# Patient Record
Sex: FEMALE | Race: WHITE | Employment: OTHER | URBAN - NONMETROPOLITAN AREA
[De-identification: names, ages, dates, MRNs, and addresses within clinical notes are randomized per-mention and may not be internally consistent; named-entity substitution may affect disease eponyms.]

---

## 2022-04-17 ENCOUNTER — APPOINTMENT (OUTPATIENT)
Dept: GENERAL RADIOLOGY | Age: 87
DRG: 502 | End: 2022-04-17
Attending: HOSPITALIST
Payer: MEDICARE

## 2022-04-17 ENCOUNTER — HOSPITAL ENCOUNTER (INPATIENT)
Age: 87
LOS: 5 days | Discharge: HOME HEALTH | DRG: 502 | End: 2022-04-22
Attending: HOSPITALIST | Admitting: FAMILY MEDICINE
Payer: MEDICARE

## 2022-04-17 PROBLEM — M65.9 TENOSYNOVITIS: Status: ACTIVE | Noted: 2022-04-17

## 2022-04-17 LAB
ALBUMIN SERPL-MCNC: 2.2 G/DL (ref 3.5–5.1)
ALP BLD-CCNC: 112 U/L (ref 38–126)
ALT SERPL-CCNC: 9 U/L (ref 11–66)
ANION GAP SERPL CALCULATED.3IONS-SCNC: 12 MEQ/L (ref 8–16)
AST SERPL-CCNC: 17 U/L (ref 5–40)
BILIRUB SERPL-MCNC: 0.3 MG/DL (ref 0.3–1.2)
BUN BLDV-MCNC: 9 MG/DL (ref 7–22)
C-REACTIVE PROTEIN: 11.25 MG/DL (ref 0–1)
CALCIUM SERPL-MCNC: 8.3 MG/DL (ref 8.5–10.5)
CHLORIDE BLD-SCNC: 103 MEQ/L (ref 98–111)
CO2: 21 MEQ/L (ref 23–33)
CREAT SERPL-MCNC: 0.6 MG/DL (ref 0.4–1.2)
GFR SERPL CREATININE-BSD FRML MDRD: > 90 ML/MIN/1.73M2
GLUCOSE BLD-MCNC: 152 MG/DL (ref 70–108)
GLUCOSE BLD-MCNC: 182 MG/DL (ref 70–108)
GLUCOSE BLD-MCNC: 286 MG/DL (ref 70–108)
POTASSIUM SERPL-SCNC: 3.3 MEQ/L (ref 3.5–5.2)
PROCALCITONIN: 0.21 NG/ML (ref 0.01–0.09)
SCAN OF BLOOD SMEAR: NORMAL
SEDIMENTATION RATE, ERYTHROCYTE: 135 MM/HR (ref 0–20)
SODIUM BLD-SCNC: 136 MEQ/L (ref 135–145)
TOTAL PROTEIN: 6.5 G/DL (ref 6.1–8)
VANCOMYCIN RANDOM: < 4 UG/ML (ref 0.1–39.9)

## 2022-04-17 PROCEDURE — 6370000000 HC RX 637 (ALT 250 FOR IP): Performed by: NURSE PRACTITIONER

## 2022-04-17 PROCEDURE — 85025 COMPLETE CBC W/AUTO DIFF WBC: CPT

## 2022-04-17 PROCEDURE — 85651 RBC SED RATE NONAUTOMATED: CPT

## 2022-04-17 PROCEDURE — 6360000002 HC RX W HCPCS: Performed by: NURSE PRACTITIONER

## 2022-04-17 PROCEDURE — 93005 ELECTROCARDIOGRAM TRACING: CPT | Performed by: NURSE PRACTITIONER

## 2022-04-17 PROCEDURE — 86140 C-REACTIVE PROTEIN: CPT

## 2022-04-17 PROCEDURE — 87801 DETECT AGNT MULT DNA AMPLI: CPT

## 2022-04-17 PROCEDURE — 80202 ASSAY OF VANCOMYCIN: CPT

## 2022-04-17 PROCEDURE — 84145 PROCALCITONIN (PCT): CPT

## 2022-04-17 PROCEDURE — 87147 CULTURE TYPE IMMUNOLOGIC: CPT

## 2022-04-17 PROCEDURE — 80053 COMPREHEN METABOLIC PANEL: CPT

## 2022-04-17 PROCEDURE — 2580000003 HC RX 258: Performed by: NURSE PRACTITIONER

## 2022-04-17 PROCEDURE — 36415 COLL VENOUS BLD VENIPUNCTURE: CPT

## 2022-04-17 PROCEDURE — 82948 REAGENT STRIP/BLOOD GLUCOSE: CPT

## 2022-04-17 PROCEDURE — 99223 1ST HOSP IP/OBS HIGH 75: CPT | Performed by: NURSE PRACTITIONER

## 2022-04-17 PROCEDURE — 71046 X-RAY EXAM CHEST 2 VIEWS: CPT

## 2022-04-17 PROCEDURE — 1200000003 HC TELEMETRY R&B

## 2022-04-17 PROCEDURE — 87040 BLOOD CULTURE FOR BACTERIA: CPT

## 2022-04-17 RX ORDER — ONDANSETRON 4 MG/1
4 TABLET, ORALLY DISINTEGRATING ORAL EVERY 8 HOURS PRN
Status: DISCONTINUED | OUTPATIENT
Start: 2022-04-17 | End: 2022-04-20 | Stop reason: SDUPTHER

## 2022-04-17 RX ORDER — LISINOPRIL 20 MG/1
20 TABLET ORAL DAILY
Status: DISCONTINUED | OUTPATIENT
Start: 2022-04-18 | End: 2022-04-22 | Stop reason: HOSPADM

## 2022-04-17 RX ORDER — COVID-19 ANTIGEN TEST
2 KIT MISCELLANEOUS 2 TIMES DAILY PRN
COMMUNITY

## 2022-04-17 RX ORDER — ACETAMINOPHEN 325 MG/1
650 TABLET ORAL EVERY 6 HOURS PRN
COMMUNITY

## 2022-04-17 RX ORDER — ATENOLOL 50 MG/1
50 TABLET ORAL DAILY
Status: DISCONTINUED | OUTPATIENT
Start: 2022-04-18 | End: 2022-04-22 | Stop reason: HOSPADM

## 2022-04-17 RX ORDER — SODIUM CHLORIDE 0.9 % (FLUSH) 0.9 %
5-40 SYRINGE (ML) INJECTION EVERY 12 HOURS SCHEDULED
Status: DISCONTINUED | OUTPATIENT
Start: 2022-04-17 | End: 2022-04-22 | Stop reason: HOSPADM

## 2022-04-17 RX ORDER — POLYETHYLENE GLYCOL 3350 17 G/17G
17 POWDER, FOR SOLUTION ORAL DAILY PRN
Status: DISCONTINUED | OUTPATIENT
Start: 2022-04-17 | End: 2022-04-22 | Stop reason: HOSPADM

## 2022-04-17 RX ORDER — NICOTINE POLACRILEX 4 MG
15 LOZENGE BUCCAL PRN
Status: DISCONTINUED | OUTPATIENT
Start: 2022-04-17 | End: 2022-04-17 | Stop reason: SDUPTHER

## 2022-04-17 RX ORDER — ATENOLOL 50 MG/1
50 TABLET ORAL DAILY
COMMUNITY

## 2022-04-17 RX ORDER — HYDROCODONE BITARTRATE AND ACETAMINOPHEN 5; 325 MG/1; MG/1
2 TABLET ORAL EVERY 4 HOURS PRN
Status: DISCONTINUED | OUTPATIENT
Start: 2022-04-17 | End: 2022-04-22 | Stop reason: HOSPADM

## 2022-04-17 RX ORDER — LOPERAMIDE HYDROCHLORIDE 2 MG/1
2 CAPSULE ORAL 4 TIMES DAILY PRN
COMMUNITY

## 2022-04-17 RX ORDER — SODIUM CHLORIDE 9 MG/ML
INJECTION, SOLUTION INTRAVENOUS PRN
Status: DISCONTINUED | OUTPATIENT
Start: 2022-04-17 | End: 2022-04-22 | Stop reason: HOSPADM

## 2022-04-17 RX ORDER — AMLODIPINE BESYLATE 5 MG/1
5 TABLET ORAL DAILY
COMMUNITY

## 2022-04-17 RX ORDER — DEXTROSE MONOHYDRATE 25 G/50ML
12.5 INJECTION, SOLUTION INTRAVENOUS PRN
Status: DISCONTINUED | OUTPATIENT
Start: 2022-04-17 | End: 2022-04-17 | Stop reason: SDUPTHER

## 2022-04-17 RX ORDER — ACETAMINOPHEN 325 MG/1
650 TABLET ORAL EVERY 6 HOURS PRN
Status: DISCONTINUED | OUTPATIENT
Start: 2022-04-17 | End: 2022-04-22 | Stop reason: HOSPADM

## 2022-04-17 RX ORDER — HYDROCHLOROTHIAZIDE 25 MG/1
12.5 TABLET ORAL DAILY
COMMUNITY

## 2022-04-17 RX ORDER — ACETAMINOPHEN 650 MG/1
650 SUPPOSITORY RECTAL EVERY 6 HOURS PRN
Status: DISCONTINUED | OUTPATIENT
Start: 2022-04-17 | End: 2022-04-22 | Stop reason: HOSPADM

## 2022-04-17 RX ORDER — SODIUM CHLORIDE 0.9 % (FLUSH) 0.9 %
5-40 SYRINGE (ML) INJECTION PRN
Status: DISCONTINUED | OUTPATIENT
Start: 2022-04-17 | End: 2022-04-22 | Stop reason: HOSPADM

## 2022-04-17 RX ORDER — LEVOTHYROXINE SODIUM 88 UG/1
88 TABLET ORAL DAILY
Status: DISCONTINUED | OUTPATIENT
Start: 2022-04-18 | End: 2022-04-22 | Stop reason: HOSPADM

## 2022-04-17 RX ORDER — LISINOPRIL 20 MG/1
20 TABLET ORAL DAILY
COMMUNITY

## 2022-04-17 RX ORDER — AMLODIPINE BESYLATE 5 MG/1
5 TABLET ORAL DAILY
Status: DISCONTINUED | OUTPATIENT
Start: 2022-04-18 | End: 2022-04-22 | Stop reason: HOSPADM

## 2022-04-17 RX ORDER — LACTOBACILLUS RHAMNOSUS GG 10B CELL
2 CAPSULE ORAL
Status: DISCONTINUED | OUTPATIENT
Start: 2022-04-17 | End: 2022-04-22 | Stop reason: HOSPADM

## 2022-04-17 RX ORDER — HYDROCODONE BITARTRATE AND ACETAMINOPHEN 5; 325 MG/1; MG/1
1 TABLET ORAL EVERY 4 HOURS PRN
Status: DISCONTINUED | OUTPATIENT
Start: 2022-04-17 | End: 2022-04-22 | Stop reason: HOSPADM

## 2022-04-17 RX ORDER — LEVOTHYROXINE SODIUM 88 UG/1
88 TABLET ORAL DAILY
COMMUNITY

## 2022-04-17 RX ORDER — DEXTROSE MONOHYDRATE 50 MG/ML
100 INJECTION, SOLUTION INTRAVENOUS PRN
Status: DISCONTINUED | OUTPATIENT
Start: 2022-04-17 | End: 2022-04-22 | Stop reason: HOSPADM

## 2022-04-17 RX ORDER — ONDANSETRON 2 MG/ML
4 INJECTION INTRAMUSCULAR; INTRAVENOUS EVERY 6 HOURS PRN
Status: DISCONTINUED | OUTPATIENT
Start: 2022-04-17 | End: 2022-04-20 | Stop reason: SDUPTHER

## 2022-04-17 RX ADMIN — VANCOMYCIN HYDROCHLORIDE 1500 MG: 5 INJECTION, POWDER, LYOPHILIZED, FOR SOLUTION INTRAVENOUS at 18:52

## 2022-04-17 RX ADMIN — INSULIN LISPRO 3 UNITS: 100 INJECTION, SOLUTION INTRAVENOUS; SUBCUTANEOUS at 17:25

## 2022-04-17 RX ADMIN — Medication 2 CAPSULE: at 17:25

## 2022-04-17 RX ADMIN — ACETAMINOPHEN 650 MG: 325 TABLET ORAL at 18:05

## 2022-04-17 RX ADMIN — PIPERACILLIN AND TAZOBACTAM 3375 MG: 3; .375 INJECTION, POWDER, LYOPHILIZED, FOR SOLUTION INTRAVENOUS at 23:08

## 2022-04-17 RX ADMIN — INSULIN LISPRO 1 UNITS: 100 INJECTION, SOLUTION INTRAVENOUS; SUBCUTANEOUS at 21:08

## 2022-04-17 ASSESSMENT — PAIN SCALES - GENERAL
PAINLEVEL_OUTOF10: 0
PAINLEVEL_OUTOF10: 3

## 2022-04-17 NOTE — PROGRESS NOTES
Transfer from [facility] 11/16/29 left hand tenosynovitis-flexor nd dorsal tendons infected, she has been there for 10 days and was being treated for cellulitis - Vanc and Zosyn, swelling and redness cant make a fist WBC 16.65 151/66 71 12 98.1 96% mild cog imp but AOx3, ortho consulted, Hx HTN DM hypothyroid Dr Fatoumata Hernandez adm

## 2022-04-17 NOTE — PROGRESS NOTES
[address] [facility] Pharmacokinetic Monitoring Service - Vancomycin     Taina Rollins is a 80 y.o. female starting on vancomycin therapy for SSTI. Pharmacy consulted by Romulo Villaseñor CNP for monitoring and adjustment. Target Concentration: Goal trough of 10-15 mg/L and AUC/DALILA <500 mg*hr/L    Additional Antimicrobials: Zosyn    Pertinent Laboratory Values: Wt Readings from Last 1 Encounters:   04/17/22 180 lb (81.6 kg)     Temp Readings from Last 1 Encounters:   04/17/22 98.2 °F (36.8 °C) (Oral)     Estimated Creatinine Clearance: 64 mL/min (based on SCr of 0.6 mg/dL). Recent Labs     04/17/22  1640   CREATININE 0.6   WBC 15.2*     Procalcitonin: 0.21 (04/17/22)    Pertinent Cultures:  Culture Date Source Results   04/17/22 BC x 2 sent   MRSA Nasal Swab: N/A. Non-respiratory infection. Plan:  Dosing recommendations based on Bayesian software  Start vancomycin 1500 mg IV q24h  Anticipated AUC of 446 and trough concentration of 12.4 at steady state  Vanc, rdm today (04/17/22 @1640 was <4.0); spoke with [facility] - last dose of vancomycin was on 04/13/22 and maintenance dose there was 1500 mg IV q24h.    Renal labs as indicated   Vancomycin concentration ordered for 04/18/22 @ 0600    Pharmacy will continue to monitor patient and adjust therapy as indicated    Thank you for the consult,  Tamie Hidalgo, [facility]  4/17/2022 5:47 PM

## 2022-04-17 NOTE — H&P
History & Physical    Patient:  Tom Garcia  YOB: 1929  Date of Service: 4/17/2022  MRN: [id number]   Acct:  [de-identified]   Primary Care Physician: No primary care provider on file. Chief Complaint:Transfer to left had tenosynovitis. Assessment / Plan:    1. Left hand tenosynovitis. Resume Zosyn and Vancomycin started at [facility] facility. Will request pharmacy to dose Vanc. Pain control, norco pain panel PRN. Elevate extremity. Obtain inflammatory markers, PCT, CBC and 2 sets of blood cultures. Consult to orthopedics. Consider ID consult. 2. Preoperative clearance. RCRI class 1, 3.9% risk of cardiac event including death in the next 30 days. METs <4. Moderate surgical risk with age and preoperative functional status. Obtain CMP, CBC, EKG and CXR to complete preoperative clearance. 3. Ess HTN. Resume home ACE/BB and Norvasc with holding parameters. 4. T2DM. Holding Metformin. Start low dose SSI. Accu checks ac/hs. Hypoglycemic protocol. Carb control meals. 5. Hypothyroidism. Synthroid resumed. 6. Advanced age. PT/OT. History of Present Illness:   History obtained from chart review, the patient and granddaughter. The patient is a 80 y.o. female who presents with left hand tenosynovitis. Please note no records have been sent with this patient and they are not available for review in [facility]. Patient reports approximately 2 weeks ago starting with left arm erythema that extended significantly up her arm. She denied fevers. Went to [address] [place] [address] and was transferred to [facility]. She was there for 10 days. She was treated with IV Vanc and Zosyn. Arm had improved. She was changed to an oral ATB for several days with worsening. She was restarted on Zosyn and Vanc. Despite IV ATBs her hand remained quite edematous, erythremic and she lost mobility of her fingers. She has good sensation. General surgery was consulted at [facility] facility.  CT of the head noted tendon involvement. Transfer was deemed appropriate to [facility], family requested this facility. Images were reviewed by our hand team, who accepted the transfer with request for hospitalist to admit. Patient does live home alone. She cares for herself with the assistance from [place] [facility] and her family. She uses a walker. Granddaughter reports some falls at home, but nothing recent. Patient is able to perform her own ADLs. She is not able to do deep cleaning, unable to walk up a flight of stair or any significant discharge. She does not exercise. She denies any dizziness, chest pain, SOB/CHARLES or syncope. No history of CAD, CHF, CVA or CKD. She does have a history of diabetes, this is controlled with Metformin alone. Past Medical History:        Diagnosis Date    Diabetes mellitus ([address]     Hypertension     Kidney stones     Thyroid disease        Past Surgical History:        Procedure Laterality Date    APPENDECTOMY      CATARACT REMOVAL Bilateral     CHOLECYSTECTOMY      HYSTERECTOMY         Home Medications:   No current facility-administered medications on file prior to encounter.      Current Outpatient Medications on File Prior to Encounter   Medication Sig Dispense Refill    amLODIPine (NORVASC) 5 MG tablet Take 5 mg by mouth daily      atenolol (TENORMIN) 50 MG tablet Take 50 mg by mouth daily      Glycerin-Hypromellose- (DRY EYE RELIEF DROPS OP) Apply 1 drop to eye as needed (both eyes)      loperamide (IMODIUM) 2 MG capsule Take 2 mg by mouth 4 times daily as needed for Diarrhea      levothyroxine (SYNTHROID) 88 MCG tablet Take 88 mcg by mouth Daily      lisinopril (PRINIVIL;ZESTRIL) 20 MG tablet Take 20 mg by mouth daily      metFORMIN (GLUCOPHAGE) 500 MG tablet Take 500 mg by mouth 2 times daily (with meals)      hydroCHLOROthiazide (HYDRODIURIL) 25 MG tablet Take 12.5 mg by mouth daily      Multiple Vitamins-Minerals (MULTIVITAMIN GUMMIES ADULT PO) Take 1 each by mouth daily      acetaminophen (TYLENOL) 325 MG tablet Take 650 mg by mouth every 6 hours as needed for Pain      Naproxen Sodium (ALEVE) 220 MG CAPS Take 2 capsules by mouth 2 times daily as needed for Pain         Allergies:  Sulfa antibiotics    Social History:    reports that she has never smoked. She has never used smokeless tobacco. She reports that she does not drink alcohol and does not use drugs. Family History:   History reviewed. No pertinent family history. Review of systems:  Constitutional: no fever, no night sweats, + fatigue  Head: no headache, no head injury, no migranes. Eye: no blurring of vision, no double vision. Ears: no hearing difficulty, no tinnitus  Mouth/throat: no ulceration, dental caries, dysphagia  Lungs: no cough, no shortness of breath, no wheeze  CVS: no palpitation, no chest pain, no shortness of breath  GI: no abdominal pain, no nausea , no vomiting, no constipation + diarrhea  RICKI: no dysuria, frequency and urgency, no hematuria, no kidney stones  Musculoskeletal: + edema left hand  Endocrine: no polyuria, polydypsia, no cold or heat intolerence  Hematology: no anemia, no easy brusing or bleeding, no hx of clotting disorder  Dermatology: no skin rash, no eczema, no prurities,  Psychiatry: no depression, no anxiety,no panic attacks, no suicide ideation  Neurology: no syncope, no seizures, no numbness or tingling of hands, no numbness or tingling of feet, no paresis    10 point review of systems completed, all other than noted above are negative. Vitals:   Vitals:    04/17/22 1445   BP: 137/61   Pulse: 63   Resp: 18   Temp: 98.2 °F (36.8 °C)   SpO2: 95%      BMI: There is no height or weight on file to calculate BMI.                 Exam:  Physical Examination: General appearance - alert, well appearing, and in no distress  Mental status - alert, oriented to person, place, and time  Neck - supple, no significant adenopathy, no JVD, or carotid bruits  Chest - clear to auscultation, no wheezes, rales or rhonchi, symmetric air entry  Heart - normal rate, regular rhythm, normal S1, S2, no murmurs, rubs, clicks or gallops  Abdomen - soft, nontender, nondistended, no masses or organomegaly  Neurological - alert, oriented, normal speech, no focal findings or movement disorder noted  Musculoskeletal - Left hand edema, erythremia, decreased ROM. Good neurovascular check to hand. Extremities - peripheral pulses normal, no pedal edema, no clubbing or cyanosis  Skin - normal coloration and turgor, no rashes, no suspicious skin lesions noted      Review of Labs and Diagnostic Testing:    No results found for this or any previous visit (from the past 24 hour(s)). Radiology:     No results found.       EKG: Pending      DVT prophylaxis: [x] Lovenox                                 [] SCDs                                 [] SQ Heparin                                 [] Encourage ambulation, low risk for DVT, no chemical or mechanical prophylaxis necessary              [] Already on Anticoagulation                Anticipated Disposition upon discharge: [x] Home                                                                         [] Home with Home Health                                                                         [] Boni Rodríguez                                                                         [] [address] [place] [address],[address]          Electronically signed by [facility] ANTHONY AGUILAR CNP on 4/17/2022 at 3:48 PM

## 2022-04-17 NOTE — CONSULTS
Orthopedic Consult    Requesting Physician: Yumi Perry, *    CHIEF COMPLAINT:  Left hand tenosynovitis    HISTORY OF PRESENT ILLNESS:      The patient is a 80 y.o., right hand dominant female  with PMH of type 2 DM controlled with metformin, HTN, and hypothyroidism who was transferred from [facility] today for left hand tenosynovitis. Patient's granddaughter is at bedside and assisting with answering questions. Redness and swelling of left hand started 4/7/22 with no known injury or cause. Presented to [facility] [place] [facility] 4/8/2022 for increased pain, redness, and swelling to left hand/forearm and was transferred to [facility] for admission for cellulitis. Erythema and swelling extended from dorsum of hand to elbow. Was started of Zosyn and Vancomycin with symptom improvement. Was transitioned to oral Keflex once symptoms began improving. Noted marked worsening of symptoms around hand and wrist at that time and was transitioned back to IV antibiotics. CT of left hand showed tendon involvement per attending physician at [facility]. Transfer was requested at that time. Patient reports increased pain and erythema to dorsum of hand and wrist. Increased swelling and associated decreased ROM to left wrist and all digits of left hand. Uses walker at all time for ambulation. Lives at home alone. Granddaughter and [place] [facility] come regularly to assist patient. Patient's granddaughter states patient will be staying with her at discharge. Patient denies chest pain, shortness of breath, lightheadedness, fever, chills, and numbness/tingling in fingers.     Past Medical History:    Past Medical History:   Diagnosis Date    Diabetes mellitus ([address]     Hypertension     Kidney stones     Thyroid disease        Past Surgical History:    Past Surgical History:   Procedure Laterality Date    APPENDECTOMY      CATARACT REMOVAL Bilateral     CHOLECYSTECTOMY      HYSTERECTOMY         Medications Prior to Admission:   Current Facility-Administered Medications   Medication Dose Route Frequency Provider Last Rate Last Admin    [START ON 4/18/2022] amLODIPine (NORVASC) tablet 5 mg  5 mg Oral Daily ANTHONY Hurd CNP        [START ON 4/18/2022] atenolol (TENORMIN) tablet 50 mg  50 mg Oral Daily ANTHONY Hurd CNP        [START ON 4/18/2022] levothyroxine (SYNTHROID) tablet 88 mcg  88 mcg Oral Daily ANTHONY Hurd CNP        [START ON 4/18/2022] lisinopril (PRINIVIL;ZESTRIL) tablet 20 mg  20 mg Oral Daily ANTHONY Mcghee CNP        insulin lispro (HUMALOG) injection vial 0-6 Units  0-6 Units SubCUTAneous TID WC ANTHONY Mcghee CNP   3 Units at 04/17/22 1725    insulin lispro (HUMALOG) injection vial 0-3 Units  0-3 Units SubCUTAneous Nightly ANTHONY Mcghee CNP        sodium chloride flush 0.9 % injection 5-40 mL  5-40 mL IntraVENous 2 times per day ANTHONY Mcghee - CNP        sodium chloride flush 0.9 % injection 5-40 mL  5-40 mL IntraVENous PRN ANTHONY Mcghee - CNP        0.9 % sodium chloride infusion   IntraVENous PRN ANTHONY Mcghee CNP        enoxaparin (LOVENOX) injection 40 mg  40 mg SubCUTAneous Q24H ANTHONY Mcghee CNP        ondansetron (ZOFRAN-ODT) disintegrating tablet 4 mg  4 mg Oral Q8H PRN ANTHONY Hurd CNP        Or    ondansetron (ZOFRAN) injection 4 mg  4 mg IntraVENous Q6H PRN ANTHONY Mcghee CNP        polyethylene glycol (GLYCOLAX) packet 17 g  17 g Oral Daily PRN ANTHONY Hurd CNP        acetaminophen (TYLENOL) tablet 650 mg  650 mg Oral Q6H PRN ANTHONY Hurd CNP   650 mg at 04/17/22 1805    Or    acetaminophen (TYLENOL) suppository 650 mg  650 mg Rectal Q6H PRN ANTHONY Mcghee CNP        piperacillin-tazobactam (ZOSYN) 3,375 mg in dextrose 5 % 50 mL IVPB extended infusion (mini-bag)  3,375 mg IntraVENous Q8H Yeyoe ECTOR Curry, APRN - CNP        glucagon (rDNA) injection 1 mg  1 mg IntraMUSCular PRN Rosa Curry, ANTHONY - CNP        dextrose 5 % solution  100 mL/hr IntraVENous PRN Rosa uCrry, APRN - CNP        lactobacillus (CULTURELLE) capsule 2 capsule  2 capsule Oral TID WC Yeyoe ECTOR Curry, APRN - CNP   2 capsule at 04/17/22 1725    dextrose bolus (hypoglycemia) 10% 125 mL  125 mL IntraVENous PRN Yeyoe N Andreschith, APRN - CNP        Or    dextrose bolus (hypoglycemia) 10% 250 mL  250 mL IntraVENous PRN Yeyoe N Andreschith, APRN - CNP        glucose chewable tablet 4 each  4 tablet Oral PRN Yeyoe N Andreschith, APRN - CNP        HYDROcodone-acetaminophen (NORCO) 5-325 MG per tablet 1 tablet  1 tablet Oral Q4H PRN Yeyoe N Patricio, APRN - CNP        Or    HYDROcodone-acetaminophen (NORCO) 5-325 MG per tablet 2 tablet  2 tablet Oral Q4H PRN Yeyoe ECTOR Camposchith, APRN - CNP        vancomycin (VANCOCIN) intermittent dosing (placeholder)   Other RX Placeholder Yeyoe ECTOR Camposchivero, APRN - CNP        vancomycin (VANCOCIN) 1,500 mg in dextrose 5 % 500 mL IVPB  1,500 mg IntraVENous Q24H Yeyoe ECTOR Curry, APRN - CNP             Allergies:  Sulfa antibiotics    Social History:   Negative for tobacco use, alcohol abuse and illicit drug abuse    Family History:  History reviewed. No pertinent family history. REVIEW OF SYSTEMS:  Negative other than noted in HPI    PHYSICAL EXAM:  Vitals:    04/17/22 1445   BP: 137/61   Pulse: 63   Resp: 18   Temp: 98.2 °F (36.8 °C)   SpO2: 95%     General examination: Patient is a well appearing 81yo female resting comfortably in bed. Awake, alert and oriented x3. No acute distress. Following commands and answering questions appropriately     Cardiovascular: Regular rate and rhythm.     Pulmonary: Regular rate and respiratory effort.  No audible wheezing or use of accessory muscles:     Right upper extremity: Skin intact no erythema, ecchymosis, or edema. No tenderness to palpation of clavicle, periscapular, AC joint, shoulder, proximal humerus, elbow, forearm, wrist, distal radius/ulna, and hand. Motor intact AIN, PIN, radial, median, ulna nerve distribution. Sensation intact light touch axillary, AIN, PIN, radial, median, ulna nerve distribution. Fingers warm and well perfused.  2+ radial pulse.     Left upper extremity: Moderate edema and erythema to dorsal and palmar aspects of hand and wrist. Moderate edema to digits 1-5. No tenderness to palpation of clavicle, periscapular, AC joint, shoulder, proximal humerus, elbow, and proximal forearm. Moderate tenderness to distal forearm, wrist, and hand. Minimal tenderness to palpation of fingers distal to MCP joints. Able to minimally flex and extend fingers. Decreased ROM limited to due swelling and pain. Pain with passive extension of fingers. Minimal pain with passive flexion of fingers. Sensation intact light touch axillary, AIN, PIN, radial, median, ulna nerve distribution. Fingers warm and well perfused.  2+ radial pulse. DATA:  CBC:   Lab Results   Component Value Date    WBC 15.2 04/17/2022    RBC 3.06 04/17/2022    HGB 9.4 04/17/2022    HCT 29.7 04/17/2022     04/17/2022     BMP:   Lab Results   Component Value Date     04/17/2022    K 3.3 04/17/2022     04/17/2022    CO2 21 04/17/2022    BUN 9 04/17/2022    CREATININE 0.6 04/17/2022    CALCIUM 8.3 04/17/2022    GLUCOSE 152 04/17/2022     PT/INR: No results found for: PROTIME, INR      Radiology: See electronic record to view reports. Reports reviewed. ASSESSMENT:Principal Problem:    Tenosynovitis  Resolved Problems:    * No resolved hospital problems. *       PLAN:  1)  Discussed patient with Dr. Yuriy Irvin.  Plan to re-evaluate in the AM. If erythema, swelling, and pain is same or worsening will plan for I&D of left hand tomorrow at 2pm. Erythematic border outlined.   2)  NPO at midnight  3) Continue IV antibiotics per hospitalist  4) Continue current medical management   5) PO pain meds as needed       Electronically signed by MABEL Callaway on 4/17/2022 at 6:07 PM

## 2022-04-17 NOTE — PROGRESS NOTES
Pt admitted to  7K15 via cart/stretcher. Complaints: left hand edema/cellulitis. IV IV push only, no IV fluids. IV site free of s/s of infection or infiltration. Vital signs obtained. Assessment and data collection initiated. Two nurse skin assessment performed by Vianca Reed and Laurel Bah. Oriented to room. Explained patients right to have family, representative or physician notified of their admission. Patient has Declined for physician to be notified. Patient has Declined for family/representative to be notified. The patient is interested in [facility]. [facility]s meds to beds program?:  No    Policies and procedures for 7 explained. All questions answered with no further questions at this time. Fall prevention and safety brochure discussed with patient. Bed alarm on. Call light in reach.

## 2022-04-18 ENCOUNTER — ANESTHESIA EVENT (OUTPATIENT)
Dept: OPERATING ROOM | Age: 87
DRG: 502 | End: 2022-04-18
Payer: MEDICARE

## 2022-04-18 ENCOUNTER — ANESTHESIA (OUTPATIENT)
Dept: OPERATING ROOM | Age: 87
DRG: 502 | End: 2022-04-18
Payer: MEDICARE

## 2022-04-18 VITALS
OXYGEN SATURATION: 95 % | RESPIRATION RATE: 2 BRPM | SYSTOLIC BLOOD PRESSURE: 116 MMHG | DIASTOLIC BLOOD PRESSURE: 56 MMHG

## 2022-04-18 LAB
ACINETOBACTER BAUMANNII FILM ARRAY: NOT DETECTED
ANION GAP SERPL CALCULATED.3IONS-SCNC: 14 MEQ/L (ref 8–16)
BASOPHILS # BLD: 0.5 %
BASOPHILS # BLD: 0.6 %
BASOPHILS ABSOLUTE: 0.1 THOU/MM3 (ref 0–0.1)
BASOPHILS ABSOLUTE: 0.1 THOU/MM3 (ref 0–0.1)
BOTTLE TYPE: ABNORMAL
BUN BLDV-MCNC: 8 MG/DL (ref 7–22)
CALCIUM SERPL-MCNC: 7.9 MG/DL (ref 8.5–10.5)
CANDIDA ALBICANS FILM ARRAY: NOT DETECTED
CANDIDA GLABRATA FILM ARRAY: NOT DETECTED
CANDIDA KRUSEI FILM ARRAY: NOT DETECTED
CANDIDA PARAPSILOSIS FILM ARRAY: NOT DETECTED
CANDIDA TROPICALIS FILM ARRAY: NOT DETECTED
CARBAPENEM RESITANT FILM ARRAY: ABNORMAL
CHLORIDE BLD-SCNC: 104 MEQ/L (ref 98–111)
CO2: 20 MEQ/L (ref 23–33)
CREAT SERPL-MCNC: 0.6 MG/DL (ref 0.4–1.2)
DIFFERENTIAL TYPE: ABNORMAL
EKG ATRIAL RATE: 62 BPM
EKG P AXIS: 59 DEGREES
EKG P-R INTERVAL: 202 MS
EKG Q-T INTERVAL: 482 MS
EKG QRS DURATION: 76 MS
EKG QTC CALCULATION (BAZETT): 489 MS
EKG R AXIS: 50 DEGREES
EKG T AXIS: 11 DEGREES
EKG VENTRICULAR RATE: 62 BPM
ENTERBACTER CLOACAE FILM ARRAY: NOT DETECTED
ENTERBACTERIACEAE FILM ARRAY: NOT DETECTED
ENTEROCOCCUS FILM ARRAY: NOT DETECTED
EOSINOPHIL # BLD: 2.1 %
EOSINOPHIL # BLD: 2.5 %
EOSINOPHILS ABSOLUTE: 0.3 THOU/MM3 (ref 0–0.4)
EOSINOPHILS ABSOLUTE: 0.3 THOU/MM3 (ref 0–0.4)
ERYTHROCYTE [DISTWIDTH] IN BLOOD BY AUTOMATED COUNT: 14.6 % (ref 11.5–14.5)
ERYTHROCYTE [DISTWIDTH] IN BLOOD BY AUTOMATED COUNT: 14.6 % (ref 11.5–14.5)
ERYTHROCYTE [DISTWIDTH] IN BLOOD BY AUTOMATED COUNT: 51.6 FL (ref 35–45)
ERYTHROCYTE [DISTWIDTH] IN BLOOD BY AUTOMATED COUNT: 53.7 FL (ref 35–45)
ESCHERICHIA COLI FILM ARRAY: NOT DETECTED
GFR SERPL CREATININE-BSD FRML MDRD: > 90 ML/MIN/1.73M2
GLUCOSE BLD-MCNC: 142 MG/DL (ref 70–108)
GLUCOSE BLD-MCNC: 148 MG/DL (ref 70–108)
GLUCOSE BLD-MCNC: 199 MG/DL (ref 70–108)
GLUCOSE BLD-MCNC: 220 MG/DL (ref 70–108)
HAEMOPHILUS INFLUENZA FILM ARRAY: NOT DETECTED
HCT VFR BLD CALC: 29.7 % (ref 37–47)
HCT VFR BLD CALC: 30.1 % (ref 37–47)
HEMOGLOBIN: 9.2 GM/DL (ref 12–16)
HEMOGLOBIN: 9.4 GM/DL (ref 12–16)
IMMATURE GRANS (ABS): 0.4 THOU/MM3 (ref 0–0.07)
IMMATURE GRANS (ABS): 0.42 THOU/MM3 (ref 0–0.07)
IMMATURE GRANULOCYTES: 2.8 %
IMMATURE GRANULOCYTES: 2.9 %
KLEBSIELLA OXYTOCA FILM ARRAY: NOT DETECTED
KLEBSIELLA PNEUMONIAE FILM ARRAY: NOT DETECTED
LISTERIA MONOCYTOGENES FILM ARRAY: NOT DETECTED
LYMPHOCYTES # BLD: 12.5 %
LYMPHOCYTES # BLD: 12.7 %
LYMPHOCYTES ABSOLUTE: 1.7 THOU/MM3 (ref 1–4.8)
LYMPHOCYTES ABSOLUTE: 1.9 THOU/MM3 (ref 1–4.8)
MAGNESIUM: 1.9 MG/DL (ref 1.6–2.4)
MCH RBC QN AUTO: 30.7 PG (ref 26–33)
MCH RBC QN AUTO: 30.8 PG (ref 26–33)
MCHC RBC AUTO-ENTMCNC: 30.6 GM/DL (ref 32.2–35.5)
MCHC RBC AUTO-ENTMCNC: 31.6 GM/DL (ref 32.2–35.5)
MCV RBC AUTO: 100.7 FL (ref 81–99)
MCV RBC AUTO: 97.1 FL (ref 81–99)
METHICILLIN RESISTANT FILM ARRAY: DETECTED
MONOCYTES # BLD: 13.9 %
MONOCYTES # BLD: 14.7 %
MONOCYTES ABSOLUTE: 2 THOU/MM3 (ref 0.4–1.3)
MONOCYTES ABSOLUTE: 2.1 THOU/MM3 (ref 0.4–1.3)
NEISSERIA MENIGITIDIS FILM ARRAY: NOT DETECTED
NUCLEATED RED BLOOD CELLS: 0 /100 WBC
NUCLEATED RED BLOOD CELLS: 0 /100 WBC
PATHOLOGIST REVIEW: ABNORMAL
PLATELET # BLD: 399 THOU/MM3 (ref 130–400)
PLATELET # BLD: 428 THOU/MM3 (ref 130–400)
PLATELET ESTIMATE: ABNORMAL
PMV BLD AUTO: 11.2 FL (ref 9.4–12.4)
PMV BLD AUTO: 11.5 FL (ref 9.4–12.4)
POTASSIUM REFLEX MAGNESIUM: 3.5 MEQ/L (ref 3.5–5.2)
PROTEUS FILM ARRAY: NOT DETECTED
PSEUDOMONAS AERUGINOSA FILM ARRAY: NOT DETECTED
RBC # BLD: 2.99 MILL/MM3 (ref 4.2–5.4)
RBC # BLD: 3.06 MILL/MM3 (ref 4.2–5.4)
SCAN OF BLOOD SMEAR: NORMAL
SEG NEUTROPHILS: 66.8 %
SEG NEUTROPHILS: 68 %
SEGMENTED NEUTROPHILS ABSOLUTE COUNT: 10.3 THOU/MM3 (ref 1.8–7.7)
SEGMENTED NEUTROPHILS ABSOLUTE COUNT: 9.3 THOU/MM3 (ref 1.8–7.7)
SERRATIA MARCESCENS FILM ARRAY: NOT DETECTED
SODIUM BLD-SCNC: 138 MEQ/L (ref 135–145)
SOURCE OF BLOOD CULTURE: ABNORMAL
STAPH AUREUS FILM ARRAY: NOT DETECTED
STAPHYLOCOCCUS FILM ARRAY: DETECTED
STREP AGALACTIAE FILM ARRAY: NOT DETECTED
STREP PNEUMONIAE FILM ARRAY: NOT DETECTED
STREP PYOCGENES FILM ARRAY: NOT DETECTED
STREPTOCOCCUS FILM ARRAY: NOT DETECTED
VANCOMYCIN RANDOM: 11 UG/ML (ref 0.1–39.9)
VANCOMYCIN RESISTANT FILM ARRAY: ABNORMAL
WBC # BLD: 13.9 THOU/MM3 (ref 4.8–10.8)
WBC # BLD: 15.2 THOU/MM3 (ref 4.8–10.8)

## 2022-04-18 PROCEDURE — 80202 ASSAY OF VANCOMYCIN: CPT

## 2022-04-18 PROCEDURE — 6360000002 HC RX W HCPCS: Performed by: NURSE PRACTITIONER

## 2022-04-18 PROCEDURE — 80048 BASIC METABOLIC PNL TOTAL CA: CPT

## 2022-04-18 PROCEDURE — 3600000002 HC SURGERY LEVEL 2 BASE: Performed by: ORTHOPAEDIC SURGERY

## 2022-04-18 PROCEDURE — 7100000000 HC PACU RECOVERY - FIRST 15 MIN: Performed by: ORTHOPAEDIC SURGERY

## 2022-04-18 PROCEDURE — 97116 GAIT TRAINING THERAPY: CPT

## 2022-04-18 PROCEDURE — 6360000002 HC RX W HCPCS: Performed by: ANESTHESIOLOGY

## 2022-04-18 PROCEDURE — 97535 SELF CARE MNGMENT TRAINING: CPT

## 2022-04-18 PROCEDURE — 83735 ASSAY OF MAGNESIUM: CPT

## 2022-04-18 PROCEDURE — 87070 CULTURE OTHR SPECIMN AEROBIC: CPT

## 2022-04-18 PROCEDURE — 7100000001 HC PACU RECOVERY - ADDTL 15 MIN: Performed by: ORTHOPAEDIC SURGERY

## 2022-04-18 PROCEDURE — 87075 CULTR BACTERIA EXCEPT BLOOD: CPT

## 2022-04-18 PROCEDURE — 2580000003 HC RX 258: Performed by: ANESTHESIOLOGY

## 2022-04-18 PROCEDURE — 1200000003 HC TELEMETRY R&B

## 2022-04-18 PROCEDURE — 85025 COMPLETE CBC W/AUTO DIFF WBC: CPT

## 2022-04-18 PROCEDURE — 6370000000 HC RX 637 (ALT 250 FOR IP): Performed by: NURSE PRACTITIONER

## 2022-04-18 PROCEDURE — 82948 REAGENT STRIP/BLOOD GLUCOSE: CPT

## 2022-04-18 PROCEDURE — 99232 SBSQ HOSP IP/OBS MODERATE 35: CPT | Performed by: NURSE PRACTITIONER

## 2022-04-18 PROCEDURE — 2580000003 HC RX 258: Performed by: NURSE PRACTITIONER

## 2022-04-18 PROCEDURE — 87205 SMEAR GRAM STAIN: CPT

## 2022-04-18 PROCEDURE — 0L960ZZ DRAINAGE OF LEFT LOWER ARM AND WRIST TENDON, OPEN APPROACH: ICD-10-PCS | Performed by: ORTHOPAEDIC SURGERY

## 2022-04-18 PROCEDURE — 97530 THERAPEUTIC ACTIVITIES: CPT

## 2022-04-18 PROCEDURE — 93010 ELECTROCARDIOGRAM REPORT: CPT | Performed by: INTERNAL MEDICINE

## 2022-04-18 PROCEDURE — 3700000001 HC ADD 15 MINUTES (ANESTHESIA): Performed by: ORTHOPAEDIC SURGERY

## 2022-04-18 PROCEDURE — 97166 OT EVAL MOD COMPLEX 45 MIN: CPT

## 2022-04-18 PROCEDURE — 3700000000 HC ANESTHESIA ATTENDED CARE: Performed by: ORTHOPAEDIC SURGERY

## 2022-04-18 PROCEDURE — 1200000000 HC SEMI PRIVATE

## 2022-04-18 PROCEDURE — 2709999900 HC NON-CHARGEABLE SUPPLY: Performed by: ORTHOPAEDIC SURGERY

## 2022-04-18 PROCEDURE — 3600000012 HC SURGERY LEVEL 2 ADDTL 15MIN: Performed by: ORTHOPAEDIC SURGERY

## 2022-04-18 PROCEDURE — 36415 COLL VENOUS BLD VENIPUNCTURE: CPT

## 2022-04-18 PROCEDURE — 97162 PT EVAL MOD COMPLEX 30 MIN: CPT

## 2022-04-18 RX ORDER — DIPHENHYDRAMINE HYDROCHLORIDE 50 MG/ML
12.5 INJECTION INTRAMUSCULAR; INTRAVENOUS
Status: DISCONTINUED | OUTPATIENT
Start: 2022-04-18 | End: 2022-04-18 | Stop reason: HOSPADM

## 2022-04-18 RX ORDER — PROPOFOL 10 MG/ML
INJECTION, EMULSION INTRAVENOUS PRN
Status: DISCONTINUED | OUTPATIENT
Start: 2022-04-18 | End: 2022-04-18 | Stop reason: SDUPTHER

## 2022-04-18 RX ORDER — IPRATROPIUM BROMIDE AND ALBUTEROL SULFATE 2.5; .5 MG/3ML; MG/3ML
1 SOLUTION RESPIRATORY (INHALATION)
Status: DISCONTINUED | OUTPATIENT
Start: 2022-04-18 | End: 2022-04-18 | Stop reason: HOSPADM

## 2022-04-18 RX ORDER — FENTANYL CITRATE 50 UG/ML
INJECTION, SOLUTION INTRAMUSCULAR; INTRAVENOUS PRN
Status: DISCONTINUED | OUTPATIENT
Start: 2022-04-18 | End: 2022-04-18 | Stop reason: SDUPTHER

## 2022-04-18 RX ORDER — SODIUM CHLORIDE 0.9 % (FLUSH) 0.9 %
5-40 SYRINGE (ML) INJECTION PRN
Status: DISCONTINUED | OUTPATIENT
Start: 2022-04-18 | End: 2022-04-18 | Stop reason: HOSPADM

## 2022-04-18 RX ORDER — MEPERIDINE HYDROCHLORIDE 25 MG/ML
12.5 INJECTION INTRAMUSCULAR; INTRAVENOUS; SUBCUTANEOUS EVERY 5 MIN PRN
Status: DISCONTINUED | OUTPATIENT
Start: 2022-04-18 | End: 2022-04-18 | Stop reason: HOSPADM

## 2022-04-18 RX ORDER — SODIUM CHLORIDE 9 MG/ML
25 INJECTION, SOLUTION INTRAVENOUS PRN
Status: DISCONTINUED | OUTPATIENT
Start: 2022-04-18 | End: 2022-04-18 | Stop reason: HOSPADM

## 2022-04-18 RX ORDER — LORAZEPAM 2 MG/ML
0.5 INJECTION INTRAMUSCULAR
Status: DISCONTINUED | OUTPATIENT
Start: 2022-04-18 | End: 2022-04-18 | Stop reason: HOSPADM

## 2022-04-18 RX ORDER — HYDRALAZINE HYDROCHLORIDE 20 MG/ML
10 INJECTION INTRAMUSCULAR; INTRAVENOUS
Status: DISCONTINUED | OUTPATIENT
Start: 2022-04-18 | End: 2022-04-18 | Stop reason: HOSPADM

## 2022-04-18 RX ORDER — SODIUM CHLORIDE 0.9 % (FLUSH) 0.9 %
5-40 SYRINGE (ML) INJECTION EVERY 12 HOURS SCHEDULED
Status: DISCONTINUED | OUTPATIENT
Start: 2022-04-18 | End: 2022-04-18 | Stop reason: HOSPADM

## 2022-04-18 RX ORDER — DROPERIDOL 2.5 MG/ML
0.62 INJECTION, SOLUTION INTRAMUSCULAR; INTRAVENOUS
Status: DISCONTINUED | OUTPATIENT
Start: 2022-04-18 | End: 2022-04-18 | Stop reason: HOSPADM

## 2022-04-18 RX ORDER — LABETALOL 20 MG/4 ML (5 MG/ML) INTRAVENOUS SYRINGE
10
Status: DISCONTINUED | OUTPATIENT
Start: 2022-04-18 | End: 2022-04-18 | Stop reason: HOSPADM

## 2022-04-18 RX ORDER — ONDANSETRON 2 MG/ML
4 INJECTION INTRAMUSCULAR; INTRAVENOUS
Status: DISCONTINUED | OUTPATIENT
Start: 2022-04-18 | End: 2022-04-18 | Stop reason: HOSPADM

## 2022-04-18 RX ORDER — POTASSIUM CHLORIDE 20 MEQ/1
40 TABLET, EXTENDED RELEASE ORAL PRN
Status: DISCONTINUED | OUTPATIENT
Start: 2022-04-18 | End: 2022-04-22 | Stop reason: HOSPADM

## 2022-04-18 RX ORDER — POTASSIUM CHLORIDE 7.45 MG/ML
10 INJECTION INTRAVENOUS PRN
Status: DISCONTINUED | OUTPATIENT
Start: 2022-04-18 | End: 2022-04-22 | Stop reason: HOSPADM

## 2022-04-18 RX ORDER — FENTANYL CITRATE 50 UG/ML
50 INJECTION, SOLUTION INTRAMUSCULAR; INTRAVENOUS EVERY 5 MIN PRN
Status: DISCONTINUED | OUTPATIENT
Start: 2022-04-18 | End: 2022-04-18 | Stop reason: HOSPADM

## 2022-04-18 RX ORDER — SODIUM CHLORIDE 9 MG/ML
INJECTION, SOLUTION INTRAVENOUS CONTINUOUS PRN
Status: DISCONTINUED | OUTPATIENT
Start: 2022-04-18 | End: 2022-04-18 | Stop reason: SDUPTHER

## 2022-04-18 RX ORDER — MORPHINE SULFATE 2 MG/ML
2 INJECTION, SOLUTION INTRAMUSCULAR; INTRAVENOUS EVERY 5 MIN PRN
Status: DISCONTINUED | OUTPATIENT
Start: 2022-04-18 | End: 2022-04-18 | Stop reason: HOSPADM

## 2022-04-18 RX ADMIN — ENOXAPARIN SODIUM 40 MG: 100 INJECTION SUBCUTANEOUS at 20:53

## 2022-04-18 RX ADMIN — SODIUM CHLORIDE: 9 INJECTION, SOLUTION INTRAVENOUS at 09:40

## 2022-04-18 RX ADMIN — PIPERACILLIN AND TAZOBACTAM 3375 MG: 3; .375 INJECTION, POWDER, LYOPHILIZED, FOR SOLUTION INTRAVENOUS at 22:48

## 2022-04-18 RX ADMIN — FENTANYL CITRATE 50 MCG: 50 INJECTION, SOLUTION INTRAMUSCULAR; INTRAVENOUS at 10:26

## 2022-04-18 RX ADMIN — FENTANYL CITRATE 50 MCG: 50 INJECTION, SOLUTION INTRAMUSCULAR; INTRAVENOUS at 10:50

## 2022-04-18 RX ADMIN — PIPERACILLIN AND TAZOBACTAM 3375 MG: 3; .375 INJECTION, POWDER, LYOPHILIZED, FOR SOLUTION INTRAVENOUS at 06:26

## 2022-04-18 RX ADMIN — FENTANYL CITRATE 25 MCG: 50 INJECTION, SOLUTION INTRAMUSCULAR; INTRAVENOUS at 09:59

## 2022-04-18 RX ADMIN — VANCOMYCIN HYDROCHLORIDE 1500 MG: 5 INJECTION, POWDER, LYOPHILIZED, FOR SOLUTION INTRAVENOUS at 19:25

## 2022-04-18 RX ADMIN — FENTANYL CITRATE 50 MCG: 50 INJECTION, SOLUTION INTRAMUSCULAR; INTRAVENOUS at 10:33

## 2022-04-18 RX ADMIN — FENTANYL CITRATE 25 MCG: 50 INJECTION, SOLUTION INTRAMUSCULAR; INTRAVENOUS at 09:57

## 2022-04-18 RX ADMIN — INSULIN LISPRO 1 UNITS: 100 INJECTION, SOLUTION INTRAVENOUS; SUBCUTANEOUS at 20:52

## 2022-04-18 RX ADMIN — PIPERACILLIN AND TAZOBACTAM 3375 MG: 3; .375 INJECTION, POWDER, LYOPHILIZED, FOR SOLUTION INTRAVENOUS at 15:56

## 2022-04-18 RX ADMIN — ATENOLOL 50 MG: 50 TABLET ORAL at 08:31

## 2022-04-18 RX ADMIN — AMLODIPINE BESYLATE 5 MG: 5 TABLET ORAL at 08:30

## 2022-04-18 RX ADMIN — LISINOPRIL 20 MG: 20 TABLET ORAL at 08:31

## 2022-04-18 RX ADMIN — ACETAMINOPHEN 650 MG: 325 TABLET ORAL at 16:00

## 2022-04-18 RX ADMIN — SODIUM CHLORIDE: 9 INJECTION, SOLUTION INTRAVENOUS at 22:47

## 2022-04-18 RX ADMIN — PROPOFOL 100 MG: 10 INJECTION, EMULSION INTRAVENOUS at 09:48

## 2022-04-18 ASSESSMENT — PULMONARY FUNCTION TESTS
PIF_VALUE: 9
PIF_VALUE: 0
PIF_VALUE: 14
PIF_VALUE: 10
PIF_VALUE: 9
PIF_VALUE: 10
PIF_VALUE: 12
PIF_VALUE: 9
PIF_VALUE: 10
PIF_VALUE: 4
PIF_VALUE: 10
PIF_VALUE: 10
PIF_VALUE: 0
PIF_VALUE: 9
PIF_VALUE: 4
PIF_VALUE: 5
PIF_VALUE: 10
PIF_VALUE: 0
PIF_VALUE: 9
PIF_VALUE: 8
PIF_VALUE: 0
PIF_VALUE: 10
PIF_VALUE: 9
PIF_VALUE: 9
PIF_VALUE: 0
PIF_VALUE: 14
PIF_VALUE: 9

## 2022-04-18 ASSESSMENT — PAIN DESCRIPTION - DESCRIPTORS
DESCRIPTORS: SORE
DESCRIPTORS: ACHING
DESCRIPTORS: SORE
DESCRIPTORS: SORE

## 2022-04-18 ASSESSMENT — PAIN DESCRIPTION - FREQUENCY
FREQUENCY: CONTINUOUS

## 2022-04-18 ASSESSMENT — PAIN SCALES - GENERAL
PAINLEVEL_OUTOF10: 4
PAINLEVEL_OUTOF10: 7
PAINLEVEL_OUTOF10: 0
PAINLEVEL_OUTOF10: 3
PAINLEVEL_OUTOF10: 0
PAINLEVEL_OUTOF10: 0
PAINLEVEL_OUTOF10: 5
PAINLEVEL_OUTOF10: 0
PAINLEVEL_OUTOF10: 8
PAINLEVEL_OUTOF10: 7

## 2022-04-18 ASSESSMENT — PAIN DESCRIPTION - ONSET
ONSET: ON-GOING

## 2022-04-18 ASSESSMENT — PAIN DESCRIPTION - LOCATION
LOCATION: HAND

## 2022-04-18 ASSESSMENT — PAIN DESCRIPTION - ORIENTATION
ORIENTATION: LEFT

## 2022-04-18 ASSESSMENT — PAIN DESCRIPTION - PAIN TYPE
TYPE: SURGICAL PAIN

## 2022-04-18 ASSESSMENT — PAIN SCALES - WONG BAKER: WONGBAKER_NUMERICALRESPONSE: 2

## 2022-04-18 ASSESSMENT — PAIN - FUNCTIONAL ASSESSMENT: PAIN_FUNCTIONAL_ASSESSMENT: PREVENTS OR INTERFERES SOME ACTIVE ACTIVITIES AND ADLS

## 2022-04-18 ASSESSMENT — PAIN DESCRIPTION - PROGRESSION: CLINICAL_PROGRESSION: NOT CHANGED

## 2022-04-18 NOTE — ANESTHESIA PRE PROCEDURE
Department of Anesthesiology  Preprocedure Note       Name:  Alethea Maria   Age:  80 y.o.  :  1929                                          MRN:  [id number]         Date:  2022      Surgeon: Simone Tran):  Tierney Sarabia MD    Procedure: Procedure(s):  HAND INCISION AND DRAINAGE    Medications prior to admission:   Prior to Admission medications    Medication Sig Start Date End Date Taking?  Authorizing Provider   amLODIPine (NORVASC) 5 MG tablet Take 5 mg by mouth daily   Yes Historical Provider, MD   atenolol (TENORMIN) 50 MG tablet Take 50 mg by mouth daily   Yes Historical Provider, MD   Glycerin-Hypromellose- (DRY EYE RELIEF DROPS OP) Apply 1 drop to eye as needed (both eyes)   Yes Historical Provider, MD   loperamide (IMODIUM) 2 MG capsule Take 2 mg by mouth 4 times daily as needed for Diarrhea   Yes Historical Provider, MD   levothyroxine (SYNTHROID) 88 MCG tablet Take 88 mcg by mouth Daily   Yes Historical Provider, MD   lisinopril (PRINIVIL;ZESTRIL) 20 MG tablet Take 20 mg by mouth daily   Yes Historical Provider, MD   metFORMIN (GLUCOPHAGE) 500 MG tablet Take 500 mg by mouth 2 times daily (with meals)   Yes Historical Provider, MD   hydroCHLOROthiazide (HYDRODIURIL) 25 MG tablet Take 12.5 mg by mouth daily   Yes Historical Provider, MD   Multiple Vitamins-Minerals (MULTIVITAMIN GUMMIES ADULT PO) Take 1 each by mouth daily   Yes Historical Provider, MD   acetaminophen (TYLENOL) 325 MG tablet Take 650 mg by mouth every 6 hours as needed for Pain   Yes Historical Provider, MD   Naproxen Sodium (ALEVE) 220 MG CAPS Take 2 capsules by mouth 2 times daily as needed for Pain   Yes Historical Provider, MD       Current medications:    Current Facility-Administered Medications   Medication Dose Route Frequency Provider Last Rate Last Admin    potassium chloride (KLOR-CON M) extended release tablet 40 mEq  40 mEq Oral PRN ANTHONY Garcia CNP        Or    potassium bicarb-citric acid (EFFER-K) effervescent tablet 40 mEq  40 mEq Oral PRN ANTHONY Pisano CNP        Or    potassium chloride 10 mEq/100 mL IVPB (Peripheral Line)  10 mEq IntraVENous PRN ANTHONY Pisano CNP        amLODIPine (NORVASC) tablet 5 mg  5 mg Oral Daily ANTHONY Burris - CNP   5 mg at 04/18/22 0830    atenolol (TENORMIN) tablet 50 mg  50 mg Oral Daily Dianne Curry APRN - CNP   50 mg at 04/18/22 0831    levothyroxine (SYNTHROID) tablet 88 mcg  88 mcg Oral Daily ANTHONY Mcghee CNP        lisinopril (PRINIVIL;ZESTRIL) tablet 20 mg  20 mg Oral Daily ANTHONY Pisano - CNP   20 mg at 04/18/22 0831    insulin lispro (HUMALOG) injection vial 0-6 Units  0-6 Units SubCUTAneous TID WC ANTHONY Mcghee - CNP   3 Units at 04/17/22 1725    insulin lispro (HUMALOG) injection vial 0-3 Units  0-3 Units SubCUTAneous Nightly ANTHONY Mcghee - CNP   1 Units at 04/17/22 2108    sodium chloride flush 0.9 % injection 5-40 mL  5-40 mL IntraVENous 2 times per day ANTHONY Pisano - CNP        sodium chloride flush 0.9 % injection 5-40 mL  5-40 mL IntraVENous PRN ANTHONY Mcghee - CNP        0.9 % sodium chloride infusion   IntraVENous PRN ANTHONY Pisano CNP        enoxaparin (LOVENOX) injection 40 mg  40 mg SubCUTAneous Q24H ANTHONY Mcghee CNP        ondansetron (ZOFRAN-ODT) disintegrating tablet 4 mg  4 mg Oral Q8H PRN ANTHONY Pisano CNP        Or    ondansetron (ZOFRAN) injection 4 mg  4 mg IntraVENous Q6H PRN ANTHONY Mcghee CNP        polyethylene glycol (GLYCOLAX) packet 17 g  17 g Oral Daily PRN ANTHONY Pisano CNP        acetaminophen (TYLENOL) tablet 650 mg  650 mg Oral Q6H PRN ANTHONY Pisano CNP   650 mg at 04/17/22 1805    Or    acetaminophen (TYLENOL) suppository 650 mg  650 mg Rectal Q6H PRN Mike ECTOR Curry, APRN - CNP        piperacillin-tazobactam (ZOSYN) 3,375 mg in dextrose 5 % 50 mL IVPB extended infusion (mini-bag)  3,375 mg IntraVENous Q8H Madai Curry, APRN - CNP 12.5 mL/hr at 04/18/22 0626 3,375 mg at 04/18/22 0626    glucagon (rDNA) injection 1 mg  1 mg IntraMUSCular PRN Madai Curry, APRN - CNP        dextrose 5 % solution  100 mL/hr IntraVENous PRN Pickett Malaika, APRN - CNP        lactobacillus (CULTURELLE) capsule 2 capsule  2 capsule Oral TID WC Yeyoe ECTOR Curry, APRN - CNP   2 capsule at 04/17/22 1725    dextrose bolus (hypoglycemia) 10% 125 mL  125 mL IntraVENous PRN Yeyoe ECTOR Curry, APRN - CNP        Or    dextrose bolus (hypoglycemia) 10% 250 mL  250 mL IntraVENous PRN Mike Curry, APRN - CNP        glucose chewable tablet 4 each  4 tablet Oral PRN Yeyoe ECTOR Curry, APRN - CNP        HYDROcodone-acetaminophen (NORCO) 5-325 MG per tablet 1 tablet  1 tablet Oral Q4H PRN Yeyoe ECTOR Curry, APRN - CNP        Or    HYDROcodone-acetaminophen (NORCO) 5-325 MG per tablet 2 tablet  2 tablet Oral Q4H PRN Madai Curry, APRN - CNP        vancomycin (VANCOCIN) intermittent dosing (placeholder)   Other RX Placeholder Mike Curry, APRN - CNP        vancomycin (VANCOCIN) 1,500 mg in dextrose 5 % 500 mL IVPB  1,500 mg IntraVENous Q24H Piyush Dai, APRN - CNP   Stopped at 04/17/22 2253     Facility-Administered Medications Ordered in Other Encounters   Medication Dose Route Frequency Provider Last Rate Last Admin    propofol injection   IntraVENous PRN Marialuisa Jeffrey MD   100 mg at 04/18/22 0948       Allergies:     Allergies   Allergen Reactions    Sulfa Antibiotics        Problem List:    Patient Active Problem List   Diagnosis Code    Tenosynovitis M65.9       Past Medical History:        Diagnosis Date    Diabetes mellitus ([facility] [address]     Hypertension     Kidney stones     Thyroid disease        Past Surgical History:        Procedure Laterality Date    APPENDECTOMY      CATARACT REMOVAL Bilateral     CHOLECYSTECTOMY      HYSTERECTOMY         Social History:    Social History     Tobacco Use    Smoking status: Never Smoker    Smokeless tobacco: Never Used   Substance Use Topics    Alcohol use: Never                                Counseling given: Not Answered      Vital Signs (Current):   Vitals:    04/17/22 1445 04/17/22 2000 04/18/22 0245 04/18/22 0815   BP: 137/61 (!) 123/48 (!) 125/49 (!) 139/55   Pulse: 63 63 70 72   Resp: 18 16 16 16   Temp: 98.2 °F (36.8 °C) 98.6 °F (37 °C) 98.5 °F (36.9 °C) 98.6 °F (37 °C)   TempSrc: Oral Oral Oral Oral   SpO2: 95% 96% 98% 94%   Weight: 180 lb (81.6 kg)      Height: 5' 6\" (1.676 m)                                                 BP Readings from Last 3 Encounters:   04/18/22 (!) 139/55   04/18/22 (!) 119/56       NPO Status:                                                                                 BMI:   Wt Readings from Last 3 Encounters:   04/17/22 180 lb (81.6 kg)     Body mass index is 29.05 kg/m².     CBC:   Lab Results   Component Value Date    WBC 13.9 04/18/2022    RBC 2.99 04/18/2022    HGB 9.2 04/18/2022    HCT 30.1 04/18/2022    .7 04/18/2022     04/18/2022       CMP:   Lab Results   Component Value Date     04/18/2022    K 3.5 04/18/2022     04/18/2022    CO2 20 04/18/2022    BUN 8 04/18/2022    CREATININE 0.6 04/18/2022    LABGLOM >90 04/18/2022    GLUCOSE 148 04/18/2022    PROT 6.5 04/17/2022    CALCIUM 7.9 04/18/2022    BILITOT 0.3 04/17/2022    ALKPHOS 112 04/17/2022    AST 17 04/17/2022    ALT 9 04/17/2022       POC Tests:   Recent Labs     04/18/22  0622   POCGLU 142*       Coags: No results found for: PROTIME, INR, APTT    HCG (If Applicable): No results found for: PREGTESTUR, PREGSERUM, HCG, HCGQUANT     ABGs: No results found for: PHART, PO2ART, VMV3GYY, UVH3JTW, BEART, O6VSSZVT     Type & Screen (If Applicable):  No results found for: LABABO, LABRH    Drug/Infectious Status (If Applicable):  No results found for: HIV, HEPCAB    COVID-19 Screening (If Applicable): No results found for: COVID19        Anesthesia Evaluation    Airway: Mallampati: II        Dental:          Pulmonary:                              Cardiovascular:    (+) hypertension:,       ECG reviewed                        Neuro/Psych:               GI/Hepatic/Renal:             Endo/Other:    (+) Diabetes, . Abdominal:             Vascular: Other Findings:             Anesthesia Plan      general     ASA 4             Anesthetic plan and risks discussed with patient.                       Jo Ann Lewis MD   4/18/2022

## 2022-04-18 NOTE — PROGRESS NOTES
[address] Pharmacokinetic Monitoring Service - Vancomycin    Consulting Provider: Fredo Sarabia   Indication: SSTI  Target Concentration: Goal AUC/DALILA 400-600 mg*hr/L  Day of Therapy: 2 - had course of therapy at OSH until 4/13  Additional Antimicrobials: Zosyn    Pertinent Laboratory Values: Wt Readings from Last 1 Encounters:   04/17/22 180 lb (81.6 kg)     Temp Readings from Last 1 Encounters:   04/18/22 98.5 °F (36.9 °C) (Oral)     Estimated Creatinine Clearance: 64 mL/min (based on SCr of 0.6 mg/dL). Recent Labs     04/17/22  1640 04/18/22  0541 04/18/22  0542   CREATININE 0.6  --  0.6   WBC 15.2* 13.9*  --        Pertinent Cultures:  Culture Date Source Results   4/17/22 BC x 2 ngtd   MRSA Nasal Swab: N/A. Non-respiratory infection.     Recent vancomycin administrations                     vancomycin (VANCOCIN) 1,500 mg in dextrose 5 % 500 mL IVPB (mg) 1,500 mg New Bag 04/17/22 9682                    Assessment:  Date/Time Current Dose Concentration Timing of Concentration (h) AUC   4/18/22 1500mg IV q24h 11 ~ 10 hrs post dose 475   Note: Serum concentrations collected for AUC dosing may appear elevated if collected in close proximity to the dose administered, this is not necessarily an indication of toxicity    Plan:  Current dosing regimen is therapeutic  Continue current dose - projected  and Ctrough 13.2  Ortho consulted  Pharmacy will continue to monitor patient and adjust therapy as indicated    Thank you for the consult,  Cassie Haddad, [address] [place] [address]  4/18/2022 7:54 AM

## 2022-04-18 NOTE — CARE COORDINATION
4/18/22, 8:28 AM EDT  DISCHARGE PLANNING EVALUATION:    Taina Form       [facility]: [facility]   [facility]/015- Reason for admit: Tenosynovitis [M65.9]   PMH:  has a past medical history of Diabetes mellitus ([address] Hypertension, Kidney stones, and Thyroid disease. To ED presents with left hand tenosynovitis  Barriers to Discharge:  Ortho consulted, planning surgery 0930 today with Dr. Yeison Mcghee. NPO, consent for OR. PCP: Cahvo Bailey MD  Readmission Risk Score: 13 ( )%    Patient Goals/Plan/Treatment Preferences: Marisa Benson remains in surgery; met with Snehal's grand daughter Serjio White. She said her grmother was at [facility] and was not getting better for 10 days. She brought her here, she was living home alone, independent, drives locally, has PCP, and insurance confirmed. She was discharged with new Interim HH from [facility].  Mimi plans to take her grand mother to her home at time of discharge. Marisa Benson has RW, tub-shower, and needed grab bars at home, lift chair. Mimi shared she [facility] [place] [employer] [facility]. for Marisa Benson. 1155 am-  Called intake staff: Edison Frederick; left message at [facility] to call this CM back. Will need new orders for [facility]. Transportation/Food Security/Housekeeping Addressed:  No issues identified.

## 2022-04-18 NOTE — ANESTHESIA POSTPROCEDURE EVALUATION
Department of Anesthesiology  Postprocedure Note    Patient: Ange Plata  MRN: [id number]  YOB: 1929  Date of evaluation: 4/18/2022  Time:  12:48 PM     Procedure Summary     Date: 04/18/22 Room / Location: [address] ROEL Aguirre    Anesthesia Start: 0940 Anesthesia Stop: 4477    Procedure: HAND INCISION AND DRAINAGE (Left Hand) Diagnosis: (Left Hand Tenosynovitis)    Surgeons: Chalino Blum MD Responsible Provider: Ashley Munoz MD    Anesthesia Type: general ASA Status: 4          Anesthesia Type: general    Enrico Phase I: Enrico Score: 9    Enrico Phase II:      Last vitals: Reviewed and per EMR flowsheets.        Anesthesia Post Evaluation    Complications: no

## 2022-04-18 NOTE — PROGRESS NOTES
1015- Pt arrived to PACU drowsy with no s/s of distress upon arrival. VSS. 1039- Attempted to call report x2 with no answer. Will try again soon. 1045- Attempted to call report for 3rd time. Spoke with KHRIS Stovall and left on hold. Awaiting call back. 1058- Call back received from [facility]. Handoff given phone. 1202- Pt transferred back to room at this time in stable condition.

## 2022-04-18 NOTE — PROGRESS NOTES
[facility]  INPATIENT OCCUPATIONAL THERAPY  [facility] ORTHOPEDICS 7K  EVALUATION    Time:   Time In: 3371  Time Out: 1512  Timed Code Treatment Minutes: 31 Minutes  Minutes: 41          Date: 2022  Patient Name: Duane Nicole,   Gender: female      MRN: [id number]  : 1929  (80 y.o.)  Referring Practitioner: ANTHONY Sofia CNP  Diagnosis: tenosynovitis  Additional Pertinent Hx: The patient is a 80 y.o. female who presents with left hand tenosynovitis. Patient reports approximately 2 weeks ago starting with left arm erythema that extended significantly up her arm. She denied fevers. Went to [address] [place] [address] and was transferred to [facility]. She was there for 10 days. She was treated with IV Vanc and Zosyn. Arm had improved. She was changed to an oral ATB for several days with worsening. She was restarted on Zosyn and Vanc. Despite IV ATBs her hand remained quite edematous, erythremic and she lost mobility of her fingers. She has good sensation. General surgery was consulted at outlying facility. CT of the head noted tendon involvement. Transfer was deemed appropriate to [facility], family requested this facility. Images were reviewed by our hand team, who accepted the transfer with request for hospitalist to admit. Patient does live home alone. She cares for herself with the assistance from [place] on aging and her family. She uses a walker. Granddaughter reports some falls at home, but nothing recent. Patient is able to perform her own ADLs. She is not able to do deep cleaning, unable to walk up a flight of stair or any significant discharge. She does not exercise. She denies any dizziness, chest pain, SOB/CHARLES or syncope. No history of CAD, CHF, CVA or CKD. She does have a history of diabetes, this is controlled with Metformin alone.  sx Left dorsal hand tenosynovectomy on  by Dr. Brennan Sarmiento    Restrictions/Precautions:  Restrictions/Precautions: General Precautions,Weight Bearing,Fall Risk  Left Upper Extremity Weight Bearing: Weight Bearing As Tolerated (WBAT after surgery on 4/18)    Subjective  Chart Reviewed: Yes,Orders,Progress Notes,Operative Notes  Patient assessed for rehabilitation services?: Yes  Response to previous treatment: Patient with no complaints from previous session  Family / Caregiver Present: Yes (2 sons present during session)    Subjective: RN approved of OT session. Pt sitting in recliner on OT arrival and agreeable to therapy. Pt's 2 son's present during session. Pain:  Pain Assessment  Patient Currently in Pain: Yes (5 LUE with movement)    Vitals: Oxygen: 94% on RA     Social/Functional History:  Lives With: Alone (Uncle who recently had heart attack staying with her and they are \"taking care of each other\")  Type of Home: House  Home Layout: Two level,Able to Live on Main level with bedroom/bathroom  Home Access: Stairs to enter with rails  Entrance Stairs - Number of Steps: 1 platform  Home Equipment: Rolling walker   Bathroom Shower/Tub: Tub/Shower unit  Bathroom Toilet: Standard  Bathroom Equipment: Toilet raiser,Grab bars in shower,Grab bars around toilet,Hand-held shower       ADL Assistance: [facility] [address]: Needs assistance (A for sweeping and cleaning. Able to do simple meal prep)  Ambulation Assistance: Independent (with 2WW)  Transfer Assistance: Independent          Additional Comments: Pt is going to go stay with [facility] afterwards where she will  have ramp to enter and level house However Per pt's son the granddaughter is a nurse who works 5 days a week    VISION:Corrected    HEARING:  [place]     COGNITION: Slow Processing, Decreased Insight, Decreased Problem Solving and Decreased Safety Awareness    RANGE OF MOTION:  Right Upper Extremity: WFL  Left Upper Extremity:  L wrist/digits not tested d/t dressing - however significant edema present.  Otherwise shoulder and elbow WFL     STRENGTH:  Bilateral Upper Extremity: generalize weakness via observation     SENSATION:   Not tested     ADL:   Grooming: Minimal Assistance. A to open mouthwash container and face cream container d/t decrease ROM and strength of L digits. Pt completed grooming task while seated   Toilet Transfer: Minimal Assistance. to/from [facility] - 's for hand placement. OT explained that pt is WBAT of LUE so can use L hand to help guide onto toilet with using armrest, however pt guarded left hand during transfer. BALANCE:  Standing Balance: Contact Guard Assistance. standing x30 seconds prior to functional mobility     BED MOBILITY:  Not Tested    TRANSFERS:  Sit to Stand: Moderate Assistance. with requiring cues for forward flexion fo trunk and correct hand placement  Stand to Sit: [employer]. cues for correct hand placement (with pt guarding LUE) and cues for correct alignment prior to sitting     FUNCTIONAL MOBILITY:  Assistive Device: Rolling Walker - attempted RW without Left platform to bathroom,  Used Left platform from bathroom to recliner per pt's preference. Assist Level:  Contact Guard Assistance. Distance: To and from bathroom  Slow pace, cues to keep RW proximal to body      ADDITIONAL ACTIVITIES:   OT educated pt and pt's family on edema management such as ice and keeping LUE elevated. Activity Tolerance:  Patient tolerance of  treatment: good. Assessment:  Assessment: Pt admitted to the hospital with the diagnosis of tenosunovitis. Pt demo'ed performance deficits with requiring min assistance for ADL tasks, and requires CGA - mod assistance for functional mobility and transfers which effect ability to perform ADLs and IADLs. Pt displayed  decreased endurance, balance, safety awareness and ability to complete  ADL tasks and mobility at [facility]. Pt requires further skilled OT Services to improve performance in functional tasks and educate on safety awareness for more indep with ADL tasks and mobility to return  home. Performance deficits / Impairments: Decreased functional mobility ,Decreased ADL status,Decreased endurance,Decreased strength,Decreased safe awareness,Decreased ROM  Prognosis: Fair  REQUIRES OT FOLLOW UP: Yes  Decision Making: Medium Complexity    Treatment Initiated: Treatment and education initiated within context of evaluation. Evaluation time included review of current medical information, gathering information related to past medical, social and functional history, completion of standardized testing, formal and informal observation of tasks, assessment of data and development of plan of care and goals. Treatment time included skilled education and facilitation of tasks to increase safety and independence with ADL's for improved functional independence and quality of life. Discharge Recommendations:  Continue to assess pending progress,Subacute/Skilled Nursing Facility,24 hour supervision or assist    Patient Education:  OT Education: OT Role,Plan of Care,Precautions,Transfer Training,ADL Adaptive Strategies    Equipment Recommendations:  Equipment Needed:  (continue to assess)    Plan:  Times per week: 5x  Times per day: Daily  Current Treatment Recommendations: Strengthening,Patient/Caregiver Education & Training,Balance Training,Functional Mobility Training,Endurance Training,Safety Education & Training,Self-Care / ADL. See long-term goal time frame for expected duration of plan of care. If no long-term goals established, a short length of stay is anticipated.     Goals:  Patient goals : return home  Short term goals  Time Frame for Short term goals: by discharge  Short term goal 1: pt will complete functional mobility to/from bathroom with SBA with using AD with or without a platform for LUE to access ADLS  Short term goal 2: pt will complete UB and LB dressing with using one handed adaptive techniques for comfort with no more than min A to increase indep with clothing management  Short term goal 3: pt will complete AROM of RUE and uneffected joints of LUE to increase strength and endurance to complete ADLs  Short term goal 4: pt will complete sit<>stand transfers, including from an ETS, with SBA and requiring 0 vc's for hand placement and correct alignment in prep to perform ADLs         Following session, patient left in safe position with all fall risk precautions in place.

## 2022-04-18 NOTE — PLAN OF CARE
Problem: Skin Integrity:  Goal: Will show no infection signs and symptoms  Description: Will show no infection signs and symptoms  Outcome: Ongoing  Note: Patient has surgical incision on left arm/hand with intact dry dressing, arm elevated on pillows, ice applied to arm     Problem: Pain:  Goal: Pain level will decrease  Description: Pain level will decrease  Outcome: Ongoing  Note: Patient taking pain medication on MAR as needed to control pain. Use of non-pharmacologic pain management including ice/repositioning. Patient pain goal is 2/10. Problem: Falls - Risk of:  Goal: Will remain free from falls  Description: Will remain free from falls  Outcome: Ongoing  Note: Patient up with staff assistance. Able to use call light. Patient has remained free of falls during this shift. Appropriate fall prevention measures in place. Problem: Neurological  Goal: Maximum potential motor/sensory/cognitive function  Outcome: Ongoing  Note: Pt alert and oriented, tolerates ambulating with one assist      Problem: Cardiovascular  Goal: No DVT, peripheral vascular complications  Outcome: Ongoing  Note: Pt receiving lovenox for dvt prophylaxis     Problem: Respiratory  Goal: No pulmonary complications  Outcome: Ongoing  Note: Pt on 3L O2 when she came back from surgery, pt on room air now, O2 sats 94%     Problem: GI  Goal: No bowel complications  Outcome: Ongoing  Note: Active bowel sounds, abdomen soft, passing gas, pt had BM today     Problem:   Goal: Adequate urinary output  Outcome: Ongoing  Note: Voiding adequately      Problem: Nutrition  Goal: Optimal nutrition therapy  Outcome: Ongoing  Note: Tolerating diet    Care plan reviewed with patient. Patient  verbalize understanding of the plan of care and contribute to goal setting.

## 2022-04-18 NOTE — PLAN OF CARE
Problem: Skin Integrity:  Goal: Will show no infection signs and symptoms  Description: Will show no infection signs and symptoms  Outcome: Ongoing  Note: Redness noted to buttocks and left arm. Excoriation noted to abdominal folds. Patient understands the importance of frequent repositioning in order to prevent any skin breakdown. Goal: Absence of new skin breakdown  Description: Absence of new skin breakdown  Outcome: Ongoing  Note: No new skin breakdown noted at this time. Problem: Pain:  Goal: Pain level will decrease  Description: Pain level will decrease  Outcome: Ongoing  Note: Patient reports pain at a 0 on scale. Patient states oral medication helping to achieve pain goal of no pain on scale. Patient able to reposition for comfort, ice applied to left hand, and pillows used for support. Problem: Falls - Risk of:  Goal: Will remain free from falls  Description: Will remain free from falls  Outcome: Ongoing  Note: Patient using call light appropriately to call for assistance. Patient is compliant with use of non-skid slippers. Patient reports understanding of fall prevention when discussed. Bed alarm remains in place. Call light in reach with family at the bedside. Goal: Absence of physical injury  Description: Absence of physical injury  Outcome: Ongoing  Note: Patient remains free from physical injury at this time. Problem: Discharge Planning:  Goal: Discharged to appropriate level of care  Description: Discharged to appropriate level of care  Outcome: Ongoing  Note: Patient plans to return home with family at discharge. Problem: Neurological  Goal: Maximum potential motor/sensory/cognitive function  Outcome: Ongoing  Note: Patient is alert and oriented x4, very hard of hearing, denies any numbness or tingling at this time, and skin is warm and dry. Problem: Cardiovascular  Goal: No DVT, peripheral vascular complications  Outcome: Ongoing  Note: Patient without s/s of DVT.  Patient able to take prescribed anticoagulants and wear SCDs to help prevent development of DVT. Problem: Respiratory  Goal: No pulmonary complications  Outcome: Ongoing  Note: Patient is 96% on room air and denies any shortness of breath at this time. Goal: O2 Sat > 90%  Outcome: Ongoing  Note: SpO2 95% on room air. Problem: GI  Goal: No bowel complications  Outcome: Ongoing  Note: Patient with bowel sounds, passing flatus, and without nausea. Taking prescribed medications to assist with bowel movement. Problem:   Goal: Adequate urinary output  Outcome: Ongoing  Note: External catheter in place. Patient denies any burning upon urination. Problem: Nutrition  Goal: Optimal nutrition therapy  Outcome: Ongoing  Note: Patient ate 100% of bedtime snack. Denies any nausea at this time. Care plan reviewed with patient and family. Patient and family verbalize understanding of the plan of care and contribute to goal setting.

## 2022-04-18 NOTE — PROGRESS NOTES
Hospitalist Progress Note    Patient:  Augustin Giordano      Unit/Bed:7K-15/015-A    YOB: 1929    MRN: [id number]       Acct: [de-identified]     PCP: Jason Mccord MD    Date of Admission: 4/17/2022    Assessment/Plan:      1. Left hand tenosynovitis s/p left hand tenosynovectomy today with Dr. Meka Wallace. Continue IV Zosyn and Vancomycin. Will request pharmacy to dose Vanc. Pain control, norco pain panel PRN. Elevate extremity. Daily dressing changes. 2. Elevated inflammatory markers due to #1. 3. Hypokalemia, resolved. Replace as needed. 4. Ess HTN. Resume home ACE/BB and Norvasc with holding parameters. 5. T2DM. Holding Metformin. Start low dose SSI. Accu checks ac/hs. Hypoglycemic protocol. Carb control meals. 6. Hypothyroidism. Synthroid resumed. 7. Advanced age. PT/OT.          Chief Complaint: Transfer to left had tenosynovitis    Hospital Course: The patient is a 80 y.o. female who presents with left hand tenosynovitis. Please note no records have been sent with this patient and they are not available for review in [facility]. Patient reports approximately 2 weeks ago starting with left arm erythema that extended significantly up her arm. She denied fevers. Went to [address] [place] [address] and was transferred to [facility]. She was there for 10 days. She was treated with IV Vanc and Zosyn. Arm had improved. She was changed to an oral ATB for several days with worsening. She was restarted on Zosyn and Vanc. Despite IV ATBs her hand remained quite edematous, erythremic and she lost mobility of her fingers. She has good sensation. General surgery was consulted at outlying facility. CT of the head noted tendon involvement. Transfer was deemed appropriate to [facility], family requested this facility. Images were reviewed by our hand team, who accepted the transfer with request for hospitalist to admit.        Patient does live home alone.  She cares for herself with the assistance from [place] on aging and her family. She uses a walker. Granddaughter reports some falls at home, but nothing recent. Patient is able to perform her own ADLs. She is not able to do deep cleaning, unable to walk up a flight of stair or any significant discharge. She does not exercise. She denies any dizziness, chest pain, SOB/CHARLES or syncope. No history of CAD, CHF, CVA or CKD. She does have a history of diabetes, this is controlled with Metformin alone. Subjective: Back from surgery. No pain to left hand when still. Tolerable pain with movement. She wants to eat. No nausea. Denies CP/SOB. Medications:  Reviewed    Infusion Medications    sodium chloride      dextrose       Scheduled Medications    amLODIPine  5 mg Oral Daily    atenolol  50 mg Oral Daily    levothyroxine  88 mcg Oral Daily    lisinopril  20 mg Oral Daily    insulin lispro  0-6 Units SubCUTAneous TID WC    insulin lispro  0-3 Units SubCUTAneous Nightly    sodium chloride flush  5-40 mL IntraVENous 2 times per day    enoxaparin  40 mg SubCUTAneous Q24H    piperacillin-tazobactam  3,375 mg IntraVENous Q8H    lactobacillus  2 capsule Oral TID WC    vancomycin (VANCOCIN) intermittent dosing (placeholder)   Other RX Placeholder    vancomycin  1,500 mg IntraVENous Q24H     PRN Meds: potassium chloride **OR** potassium alternative oral replacement **OR** potassium chloride, sodium chloride flush, sodium chloride, ondansetron **OR** ondansetron, polyethylene glycol, acetaminophen **OR** acetaminophen, glucagon (rDNA), dextrose, dextrose bolus (hypoglycemia) **OR** dextrose bolus (hypoglycemia), glucose, HYDROcodone 5 mg - acetaminophen **OR** HYDROcodone 5 mg - acetaminophen      Intake/Output Summary (Last 24 hours) at 4/18/2022 1247  Last data filed at 4/18/2022 0354  Gross per 24 hour   Intake 600 ml   Output 100 ml   Net 500 ml       Diet:  ADULT DIET;  Regular    Exam:  BP (!) 147/64   Pulse 66   Temp 98.2 °F (36.8 °C) (Oral) Resp 20   Ht 5' 6\" (1.676 m)   Wt 180 lb (81.6 kg)   LMP  (LMP Unknown)   SpO2 97%   Breastfeeding No   BMI 29.05 kg/m²     General appearance: No apparent distress, appears stated age and cooperative. HEENT: Pupils equal, round, and reactive to light. Conjunctivae/corneas clear. Neck: Supple, with full range of motion. No jugular venous distention. Trachea midline. Respiratory:  Normal respiratory effort. Clear to auscultation, bilaterally without Rales/Wheezes/Rhonchi. Cardiovascular: Regular rate and rhythm with normal S1/S2 without murmurs, rubs or gallops. Abdomen: Soft, non-tender, non-distended with normal bowel sounds. Musculoskeletal: passive and active ROM x 3 extremities. Left UE with surgical dressing. Good neurovascular checks. Skin: Skin color, texture, turgor normal.  Erythema to left hand. Neurologic:  Neurovascularly intact without any focal sensory/motor deficits. Cranial nerves: II-XII intact, grossly non-focal.  Psychiatric: Alert and oriented, thought content appropriate, normal insight  Capillary Refill: Brisk,< 3 seconds   Peripheral Pulses: +2 palpable, equal bilaterally       Labs:   Recent Labs     04/17/22  1640 04/18/22  0541   WBC 15.2* 13.9*   HGB 9.4* 9.2*   HCT 29.7* 30.1*   * 399     Recent Labs     04/17/22  1640 04/18/22  0542    138   K 3.3* 3.5    104   CO2 21* 20*   BUN 9 8   CREATININE 0.6 0.6   CALCIUM 8.3* 7.9*     Recent Labs     04/17/22  1640   AST 17   ALT 9*   BILITOT 0.3   ALKPHOS 112     No results for input(s): INR in the last 72 hours. No results for input(s): Amalia Pace in the last 72 hours. Urinalysis:    No results found for: Edmondson Diamondulich, BACTERIA, RBCUA, BLOODU, SPECGRAV, [address] [place] [address]    Radiology:  XR CHEST (2 VW)   Final Result   Borderline heart size. Bilateral small pleural effusions. **This report has been created using voice recognition software.  It may contain minor errors which are inherent in voice recognition technology. **      Final report electronically signed by Dr. Yang Jones on 4/17/2022 5:26 PM          Diet: ADULT DIET;  Regular    DVT prophylaxis: [x] Lovenox                                 [] SCDs                                 [] SQ Heparin                                 [] Encourage ambulation           [] Already on Anticoagulation     Disposition:    [x] Home       [] TCU       [] Rehab       [] Psych       [] SNF       [] [place]       [] Other-    Code Status: Full Code            Electronically signed by ANTHONY Medeiros CNP on 4/18/2022 at 12:47 PM

## 2022-04-18 NOTE — PROGRESS NOTES
[address] [address]  INPATIENT PHYSICAL THERAPY  EVALUATION  STRZ OR - STRZ OR (General) POOL R*    Time In: 3110  Time Out: 0813  Timed Code Treatment Minutes: 17 Minutes  Minutes: 26          Date: 2022  Patient Name: Henrique Miller,  Gender:  female        MRN: [id number]  : 1929  (80 y.o.)      Referring Practitioner: ANTHONY Sidhu CNP  Diagnosis: Tenosynovitis  Additional Pertinent Hx: The patient is a 80 y.o. female who presents with left hand tenosynovitis. Patient reports approximately 2 weeks ago starting with left arm erythema that extended significantly up her arm. She denied fevers. Went to [address] [place] [address] and was transferred to [facility]. She was there for 10 days. She was treated with IV Vanc and Zosyn. Arm had improved. She was changed to an oral ATB for several days with worsening. She was restarted on Zosyn and Vanc. Despite IV ATBs her hand remained quite edematous, erythremic and she lost mobility of her fingers. She has good sensation. General surgery was consulted at outlying facility. CT of the head noted tendon involvement. Transfer was deemed appropriate to [facility], family requested this facility. Images were reviewed by our hand team, who accepted the transfer with request for hospitalist to admit. Patient does live home alone. She cares for herself with the assistance from [place] on aging and her family. She uses a walker. Granddaughter reports some falls at home, but nothing recent. Patient is able to perform her own ADLs. She is not able to do deep cleaning, unable to walk up a flight of stair or any significant discharge. She does not exercise. She denies any dizziness, chest pain, SOB/CHARLES or syncope. No history of CAD, CHF, CVA or CKD. She does have a history of diabetes, this is controlled with Metformin alone.      Restrictions/Precautions:  Restrictions/Precautions: General Precautions,Weight Bearing,Fall Risk  Left Upper Extremity Weight Bearing: Non Weight Bearing    Subjective:  Chart Reviewed: Yes  Patient assessed for rehabilitation services?: Yes  Family / Caregiver Present: Yes (granddaughter)  Subjective: RN approved session. Pt pleasant and agreeable to therapy. Per Pt granddaughter in room, at previous hospital pt was at, they were using a clay walker with pt and she was doing well with it. Both Clay-walker and RW with platform are in the room at this time. Will trial and see which one works best for patient.      General:  Overall Orientation Status: Within Functional Limits  Follows Commands: Within Functional Limits    Vision: Within Functional Limits    Hearing: Within functional limits         Pain: 0/10: denies pain     Vitals: Vitals not assessed per clinical judgement, see nursing flowsheet    Social/Functional History:    Lives With: Alone (Uncle who recently had heart attack staying with her and they are \"taking care of each other\")  Type of Home: House  Home Layout: Two level,Able to Live on Main level with bedroom/bathroom  Home Access: Stairs to enter with rails  Entrance Stairs - Number of Steps: 1 platform  Home Equipment: Rolling walker             ADL Assistance: [address] [place] [address] Avenue: Independent  Ambulation Assistance: Independent (with 2WW)  Transfer Assistance: Independent          Additional Comments: Pt is going to go stay with [facility] where she will  have ramp to enter and level house    OBJECTIVE:  Range of Motion:  Bilateral Lower Extremity: WFL    Strength:  Bilateral Lower Extremity: Impaired - grossly deconditioned    Balance:  Static Sitting Balance:  Stand By Assistance  Static Standing Balance: Contact Guard Assistance    Bed Mobility:  Supine to Sit: Minimal Assistance, X 1, with head of bed raised, with rail, with increased time for completion    Transfers:  Sit to Stand: Minimal Assistance, X 1, with increased time for completion, cues for hand placement  Stand to Sit:Contact Guard Assistance, X 1, with increased time for completion, cues for hand placement    Ambulation:  Contact Guard Assistance, X 1, with cues for safety, with increased time for completion  Distance: 6'   Surface: Level Tile  Device:RW with L platform   Gait Deviations:  Slow Terrie, Decreased Step Length Bilaterally, Decreased Gait Speed, Decreased Heel Strike Bilaterally, Mild Path Deviations and assist required for RW navigation     Functional Outcome Measures: Completed  AM-PAC Inpatient Mobility Raw Score : 17  AM-PAC Inpatient T-Scale Score : 42.13    ASSESSMENT:  Activity Tolerance:  Patient tolerance of  treatment: good. Treatment Initiated: Treatment and education initiated within context of evaluation. Evaluation time included review of current medical information, gathering information related to past medical, social and functional history, completion of standardized testing, formal and informal observation of tasks, assessment of data and development of plan of care and goals. Treatment time included skilled education and facilitation of tasks to increase safety and independence with functional mobility for improved independence and quality of life. Assessment: Body structures, Functions, Activity limitations: Decreased functional mobility ,Decreased balance,Decreased posture,Decreased strength,Decreased endurance  Assessment: Pt demonstrates a decrease in baseline by way of bed mobility, transfers and ambulation secondary to decreased activity tolerance, strength, fatigue, and balance deficits. Pt will benefit from skilled PT services throughout admission and beyond hospital discharge for improvements in functional mobility and in order to decrease fall risk and return pt to OF.      Prognosis: Good    REQUIRES PT FOLLOW UP: Yes    Discharge Recommendations:  Discharge Recommendations: Continue to assess pending progress    Patient Education:  PT Education: Christo Read Role,Functional Mobility Training    Equipment Recommendations:  Equipment Needed:  (continue to assess)    Plan:  Times per week: 5x O  Times per day: Daily  Current Treatment Recommendations: Strengthening,Balance Training,Transfer Training,Gait Training,Functional Mobility Training    Goals:  Patient goals : none stated  Short term goals  Time Frame for Short term goals: by discharge  Short term goal 1: bed mobility with HOB flat, no rails mod I for increased functional ind  Short term goal 2: sit<>stand from various surfaces with LRD mod I safe transfers  Short term goal 3: ambulate 80' with LRD Mod I for safe household distances  Long term goals  Time Frame for Long term goals : NA d/t short ELOS    Following session, patient left in safe position with all fall risk precautions in place.     Christina Valencia PT, DPT

## 2022-04-18 NOTE — OP NOTE
Operative Note      Patient: Namita Pollack  YOB: 1929  MRN: [id number]    Date of Procedure: 4/18/2022    Pre-Op Diagnosis: Dorsal left hand tenosynovitis    Post-Op Diagnosis: Same       Procedure:  Left dorsal hand tenosynovectomy    Surgeon(s):  Bri Almazan MD    Assistant:   Physician Assistant: Corie Arias PA-C    Anesthesia: General    Estimated Blood Loss (mL): less than 968     Complications: None    Specimens:   ID Type Source Tests Collected by Time Destination   1 : LEFT HAND TISSUE  Tissue Hand CULTURE, ANAEROBIC AND AEROBIC Bri Almazan MD 4/18/2022 1000        Implants:  * No implants in log *      Drains:   External Urinary Catheter (Active)   Output (mL) 100 mL 04/18/22 0354   Suction 40 mmgHg continuous 04/17/22 2000   Placement Initiated 04/17/22 2000   Skin Assessment No Injury 04/17/22 2000       Findings: Confirmed    Detailed Description of Procedure: Indications  This is a 19-year-old female developed a swollen left hand treated in outside institution. She been on antibiotics for about 9 days. She was transferred here for definitive care. She got a consolidated area on the dorsal aspect of the hand. Volarly actually pretty good. I do CT scan concerning for tenosynovitis. Felt she would benefit from incision and drainage with a tenosynovitis to me. Patient family agreed. Narrative  Patient was taken the operating room underwent a general anesthetic. Left upper extremity was prepped draped normal sterile fashion. Timeout was taken consent was confirmed. Made an incision in line with the long digit. Extended from metacarpophalangeal joint to the wrist.  Opened up the retinaculum around the tendons gross purulence was obtained we then extended our release more proximally. Care was taken to protect extensor pollicis longus. Sent tissue off for culture. We then thoroughly irrigated around the tendons. Does not appear to go volarly.   After thorough irrigation we placed a few nylon sutures dry dressings. The patient was awakened and taken to the recovery room in good condition. Postoperative plan  Weightbearing as tolerated. Dry dressings as necessary. I would change them daily for the time being. She can have a fair amount of drainage for the next few days.   We will see her in the office in 2 to 3 weeks clinical exam suture removal.    Electronically signed by Lea Lay MD on 4/18/2022 at 10:02 AM

## 2022-04-18 NOTE — PROGRESS NOTES
Orthopedic Surgery      Orthopaedic Attending Note    Patient seen and evaluated   Discussed surgery with the patient and family, consent received. Surgery planned for 0930 today with Dr. Aristides Martinez. Continue NPO.   Electronically signed by ANTHONY Arauz CNP on 4/18/2022 at 8:07 AM

## 2022-04-19 LAB
ANION GAP SERPL CALCULATED.3IONS-SCNC: 11 MEQ/L (ref 8–16)
BASOPHILS # BLD: 0.5 %
BASOPHILS ABSOLUTE: 0.1 THOU/MM3 (ref 0–0.1)
BLOOD CULTURE, ROUTINE: ABNORMAL
BLOOD CULTURE, ROUTINE: ABNORMAL
BUN BLDV-MCNC: 7 MG/DL (ref 7–22)
CALCIUM SERPL-MCNC: 7.8 MG/DL (ref 8.5–10.5)
CHLORIDE BLD-SCNC: 104 MEQ/L (ref 98–111)
CO2: 19 MEQ/L (ref 23–33)
CREAT SERPL-MCNC: 0.7 MG/DL (ref 0.4–1.2)
EOSINOPHIL # BLD: 1.8 %
EOSINOPHILS ABSOLUTE: 0.2 THOU/MM3 (ref 0–0.4)
ERYTHROCYTE [DISTWIDTH] IN BLOOD BY AUTOMATED COUNT: 14.7 % (ref 11.5–14.5)
ERYTHROCYTE [DISTWIDTH] IN BLOOD BY AUTOMATED COUNT: 57 FL (ref 35–45)
GFR SERPL CREATININE-BSD FRML MDRD: 78 ML/MIN/1.73M2
GLUCOSE BLD-MCNC: 138 MG/DL (ref 70–108)
GLUCOSE BLD-MCNC: 140 MG/DL (ref 70–108)
GLUCOSE BLD-MCNC: 168 MG/DL (ref 70–108)
GLUCOSE BLD-MCNC: 169 MG/DL (ref 70–108)
GLUCOSE BLD-MCNC: 234 MG/DL (ref 70–108)
HCT VFR BLD CALC: 28.5 % (ref 37–47)
HEMOGLOBIN: 8.5 GM/DL (ref 12–16)
IMMATURE GRANS (ABS): 0.38 THOU/MM3 (ref 0–0.07)
IMMATURE GRANULOCYTES: 2.8 %
LYMPHOCYTES # BLD: 15.7 %
LYMPHOCYTES ABSOLUTE: 2.2 THOU/MM3 (ref 1–4.8)
MCH RBC QN AUTO: 31.3 PG (ref 26–33)
MCHC RBC AUTO-ENTMCNC: 29.8 GM/DL (ref 32.2–35.5)
MCV RBC AUTO: 104.8 FL (ref 81–99)
MONOCYTES # BLD: 16.1 %
MONOCYTES ABSOLUTE: 2.2 THOU/MM3 (ref 0.4–1.3)
NUCLEATED RED BLOOD CELLS: 0 /100 WBC
ORGANISM: ABNORMAL
PLATELET # BLD: 389 THOU/MM3 (ref 130–400)
PMV BLD AUTO: 11.2 FL (ref 9.4–12.4)
POTASSIUM SERPL-SCNC: 3.2 MEQ/L (ref 3.5–5.2)
RBC # BLD: 2.72 MILL/MM3 (ref 4.2–5.4)
SEG NEUTROPHILS: 63.1 %
SEGMENTED NEUTROPHILS ABSOLUTE COUNT: 8.6 THOU/MM3 (ref 1.8–7.7)
SODIUM BLD-SCNC: 134 MEQ/L (ref 135–145)
WBC # BLD: 13.7 THOU/MM3 (ref 4.8–10.8)

## 2022-04-19 PROCEDURE — 6370000000 HC RX 637 (ALT 250 FOR IP): Performed by: NURSE PRACTITIONER

## 2022-04-19 PROCEDURE — 6360000002 HC RX W HCPCS: Performed by: NURSE PRACTITIONER

## 2022-04-19 PROCEDURE — 97530 THERAPEUTIC ACTIVITIES: CPT

## 2022-04-19 PROCEDURE — 1200000000 HC SEMI PRIVATE

## 2022-04-19 PROCEDURE — 85025 COMPLETE CBC W/AUTO DIFF WBC: CPT

## 2022-04-19 PROCEDURE — 97116 GAIT TRAINING THERAPY: CPT

## 2022-04-19 PROCEDURE — 2580000003 HC RX 258: Performed by: NURSE PRACTITIONER

## 2022-04-19 PROCEDURE — 99232 SBSQ HOSP IP/OBS MODERATE 35: CPT | Performed by: NURSE PRACTITIONER

## 2022-04-19 PROCEDURE — 97535 SELF CARE MNGMENT TRAINING: CPT

## 2022-04-19 PROCEDURE — 1200000003 HC TELEMETRY R&B

## 2022-04-19 PROCEDURE — 82948 REAGENT STRIP/BLOOD GLUCOSE: CPT

## 2022-04-19 PROCEDURE — 36415 COLL VENOUS BLD VENIPUNCTURE: CPT

## 2022-04-19 PROCEDURE — 80048 BASIC METABOLIC PNL TOTAL CA: CPT

## 2022-04-19 RX ADMIN — ACETAMINOPHEN 650 MG: 325 TABLET ORAL at 22:09

## 2022-04-19 RX ADMIN — ATENOLOL 50 MG: 50 TABLET ORAL at 08:20

## 2022-04-19 RX ADMIN — INSULIN LISPRO 1 UNITS: 100 INJECTION, SOLUTION INTRAVENOUS; SUBCUTANEOUS at 11:25

## 2022-04-19 RX ADMIN — ACETAMINOPHEN 650 MG: 325 TABLET ORAL at 08:20

## 2022-04-19 RX ADMIN — LISINOPRIL 20 MG: 20 TABLET ORAL at 08:20

## 2022-04-19 RX ADMIN — VANCOMYCIN HYDROCHLORIDE 1500 MG: 5 INJECTION, POWDER, LYOPHILIZED, FOR SOLUTION INTRAVENOUS at 18:32

## 2022-04-19 RX ADMIN — ENOXAPARIN SODIUM 40 MG: 100 INJECTION SUBCUTANEOUS at 20:06

## 2022-04-19 RX ADMIN — Medication 2 CAPSULE: at 08:20

## 2022-04-19 RX ADMIN — INSULIN LISPRO 1 UNITS: 100 INJECTION, SOLUTION INTRAVENOUS; SUBCUTANEOUS at 20:06

## 2022-04-19 RX ADMIN — Medication 2 CAPSULE: at 11:26

## 2022-04-19 RX ADMIN — PIPERACILLIN AND TAZOBACTAM 3375 MG: 3; .375 INJECTION, POWDER, LYOPHILIZED, FOR SOLUTION INTRAVENOUS at 11:44

## 2022-04-19 RX ADMIN — LEVOTHYROXINE SODIUM 88 MCG: 0.09 TABLET ORAL at 06:30

## 2022-04-19 RX ADMIN — PIPERACILLIN AND TAZOBACTAM 3375 MG: 3; .375 INJECTION, POWDER, LYOPHILIZED, FOR SOLUTION INTRAVENOUS at 04:41

## 2022-04-19 RX ADMIN — AMLODIPINE BESYLATE 5 MG: 5 TABLET ORAL at 08:20

## 2022-04-19 RX ADMIN — SODIUM CHLORIDE: 9 INJECTION, SOLUTION INTRAVENOUS at 04:40

## 2022-04-19 RX ADMIN — PIPERACILLIN AND TAZOBACTAM 3375 MG: 3; .375 INJECTION, POWDER, LYOPHILIZED, FOR SOLUTION INTRAVENOUS at 22:14

## 2022-04-19 ASSESSMENT — PAIN SCALES - GENERAL
PAINLEVEL_OUTOF10: 2
PAINLEVEL_OUTOF10: 0
PAINLEVEL_OUTOF10: 4
PAINLEVEL_OUTOF10: 0
PAINLEVEL_OUTOF10: 5

## 2022-04-19 ASSESSMENT — PAIN DESCRIPTION - ORIENTATION
ORIENTATION: LEFT
ORIENTATION: LEFT

## 2022-04-19 ASSESSMENT — PAIN DESCRIPTION - PAIN TYPE
TYPE: SURGICAL PAIN
TYPE: SURGICAL PAIN

## 2022-04-19 ASSESSMENT — PAIN DESCRIPTION - PROGRESSION: CLINICAL_PROGRESSION: GRADUALLY IMPROVING

## 2022-04-19 ASSESSMENT — PAIN DESCRIPTION - ONSET
ONSET: ON-GOING
ONSET: ON-GOING

## 2022-04-19 ASSESSMENT — PAIN DESCRIPTION - LOCATION
LOCATION: HAND
LOCATION: HAND

## 2022-04-19 ASSESSMENT — PAIN DESCRIPTION - FREQUENCY
FREQUENCY: INTERMITTENT
FREQUENCY: INTERMITTENT

## 2022-04-19 ASSESSMENT — PAIN DESCRIPTION - DESCRIPTORS
DESCRIPTORS: ACHING
DESCRIPTORS: ACHING

## 2022-04-19 ASSESSMENT — PAIN - FUNCTIONAL ASSESSMENT: PAIN_FUNCTIONAL_ASSESSMENT: ACTIVITIES ARE NOT PREVENTED

## 2022-04-19 ASSESSMENT — PAIN SCALES - WONG BAKER: WONGBAKER_NUMERICALRESPONSE: 4

## 2022-04-19 NOTE — PROGRESS NOTES
[address] [address]  INPATIENT PHYSICAL THERAPY  DAILY NOTE  [facility] ORTHOPEDICS 7K - 7K-15/015-A    Time In: 0815  Time Out: 6886  Timed Code Treatment Minutes: 45 Minutes  Minutes: 38          Date: 2022  Patient Name: Gloria Aguillon,  Gender:  female        MRN: [id number]  : 1929  (80 y.o.)     Referring Practitioner: ANTHONY Giles CNP  Diagnosis: Tenosynovitis  Additional Pertinent Hx: The patient is a 80 y.o. female who presents with left hand tenosynovitis. Patient reports approximately 2 weeks ago starting with left arm erythema that extended significantly up her arm. She denied fevers. Went to [address] [place] [address] and was transferred to [facility]. She was there for 10 days. She was treated with IV Vanc and Zosyn. Arm had improved. She was changed to an oral ATB for several days with worsening. She was restarted on Zosyn and Vanc. Despite IV ATBs her hand remained quite edematous, erythremic and she lost mobility of her fingers. She has good sensation. General surgery was consulted at outlying facility. CT of the head noted tendon involvement. Transfer was deemed appropriate to [facility], family requested this facility. Images were reviewed by our hand team, who accepted the transfer with request for hospitalist to admit. Patient does live home alone. She cares for herself with the assistance from [place] on aging and her family. She uses a walker. Granddaughter reports some falls at home, but nothing recent. Patient is able to perform her own ADLs. She is not able to do deep cleaning, unable to walk up a flight of stair or any significant discharge. She does not exercise. She denies any dizziness, chest pain, SOB/CHARLES or syncope. No history of CAD, CHF, CVA or CKD. She does have a history of diabetes, this is controlled with Metformin alone. Pt is s/p L dorsal hand tenosynovectomy by Dr Veronica Gray .      Prior Level of Function:  Lives With: Alone (Uncle who recently had heart attack staying with her and they are \"taking care of each other\")  Type of Home: House  Home Layout: Two level,Able to Live on Main level with bedroom/bathroom  Home Access: Stairs to enter with rails  Entrance Stairs - Number of Steps: 1 platform  Home Equipment: Rolling walker   Bathroom Shower/Tub: Tub/Shower unit  Bathroom Toilet: Standard  Bathroom Equipment: Toilet raiser,Grab bars in shower,Grab bars around toilet,Hand-held shower    ADL Assistance: [address]: Needs assistance (A for sweeping and cleaning. Able to do simple meal prep)  Ambulation Assistance: Independent (with 2WW)  Transfer Assistance: Independent  Additional Comments: Pt is going to go stay with juliana afterwards where she will  have ramp to enter and level house However Per pt's son the granddaughter is a nurse who works 5 days a week    Restrictions/Precautions:  Restrictions/Precautions: General Precautions,Weight Bearing,Fall Risk  Left Upper Extremity Weight Bearing: Weight Bearing As Tolerated (WBAT after surgery on 4/18)     SUBJECTIVE: RN approved session, pt is supine in bed, agreeable to PT. Pt requests to continue to use platform walker for comfort, is WBAT since having surgery yesterday, pt education on WB status. PAIN: 4-5/10    Vitals: Vitals not assessed per clinical judgement, see nursing flowsheet    OBJECTIVE:  Bed Mobility:  Supine to Sit: Minimal Assistance, X 1, with head of bed raised    Transfers:  Sit to Stand: Moderate Assistance, X 1, with increased time for completion, cues for hand placement; from EOB and toilet x 2 trials, cues for hand placement  Stand to Sit:Minimal Assistance, X 1; assistance for L UE managment    Ambulation:  Contact Guard Assistance  Distance: 15 feet, 10 feet  Surface: Level Tile  Device:Rolling Walker  Gait Deviations:   Forward Flexed Posture, Slow Terrie, Decreased Step Length Bilaterally and Decreased Gait Speed  **Verbal cues to ambulate closer to walker    Balance:  Static Sitting Balance:  Stand By Assistance; sits EOB at least 15 minutes, platform walker readjusted for proper fit  Static Standing Balance: Contact Guard Assistance  Dynamic Standing Balance: Contact Guard Assistance    Functional Outcome Measures: Not completed     ASSESSMENT:  Assessment: Patient progressing toward established goals. Activity Tolerance:  Patient tolerance of  treatment: good. Equipment Recommendations:Equipment Needed:  (continue to assess)  Discharge Recommendations: 24 hour assistance or supervision and Home with Home Health PT  Plan: Times per week: 5x O  Times per day: Daily  Current Treatment Recommendations: Strengthening,Balance Training,Transfer Training,Gait Training,Functional Mobility Training    Patient Education  Patient Education: Precautions/Restrictions, Transfers, Gait    Goals:  Patient goals : none stated  Short term goals  Time Frame for Short term goals: by discharge  Short term goal 1: bed mobility with HOB flat, no rails mod I for increased functional ind  Short term goal 2: sit<>stand from various surfaces with LRD mod I safe transfers  Short term goal 3: ambulate Shelly Paula 1560' with LRD Mod I for safe household distances  Long term goals  Time Frame for Long term goals : NA d/t short ELOS    Following session, patient left in safe position with all fall risk precautions in place.

## 2022-04-19 NOTE — PROGRESS NOTES
Orthopaedic Progress Note      SUBJECTIVE:    POD 1 s/p I and D left hand  Cultures no growth. Pain improving, finger swelling improving. Physical    Vitals:    04/19/22 1150   BP: (!) 122/49   Pulse: 59   Resp: 16   Temp:    SpO2: 93%         OBJECTIVE  LUE dressings c/d/i. Stiff finger motion passively. BCR.        Data  CBC:   Lab Results   Component Value Date    WBC 13.7 04/19/2022    RBC 2.72 04/19/2022    HGB 8.5 04/19/2022    HCT 28.5 04/19/2022    .8 04/19/2022    MCH 31.3 04/19/2022    MCHC 29.8 04/19/2022     04/19/2022    MPV 11.2 04/19/2022     BMP:    Lab Results   Component Value Date     04/19/2022    K 3.2 04/19/2022    K 3.5 04/18/2022     04/19/2022    CO2 19 04/19/2022    BUN 7 04/19/2022    LABALBU 2.2 04/17/2022    CREATININE 0.7 04/19/2022    CALCIUM 7.8 04/19/2022    LABGLOM 78 04/19/2022    GLUCOSE 138 04/19/2022     Uric Acid:  No components found for: URIC  PT/INR:  No results found for: PROTIME, INR  PTT:  No results found for: APTT, PTT[APTT  Troponin:  No results found for: TROPONINI  Urine Culture:  No components found for: CURINE    Current Inpatient Medications    Current Facility-Administered Medications: potassium chloride (KLOR-CON M) extended release tablet 40 mEq, 40 mEq, Oral, PRN **OR** potassium bicarb-citric acid (EFFER-K) effervescent tablet 40 mEq, 40 mEq, Oral, PRN **OR** potassium chloride 10 mEq/100 mL IVPB (Peripheral Line), 10 mEq, IntraVENous, PRN  amLODIPine (NORVASC) tablet 5 mg, 5 mg, Oral, Daily  atenolol (TENORMIN) tablet 50 mg, 50 mg, Oral, Daily  levothyroxine (SYNTHROID) tablet 88 mcg, 88 mcg, Oral, Daily  lisinopril (PRINIVIL;ZESTRIL) tablet 20 mg, 20 mg, Oral, Daily  insulin lispro (HUMALOG) injection vial 0-6 Units, 0-6 Units, SubCUTAneous, TID WC  insulin lispro (HUMALOG) injection vial 0-3 Units, 0-3 Units, SubCUTAneous, Nightly  sodium chloride flush 0.9 % injection 5-40 mL, 5-40 mL, IntraVENous, 2 times per day  sodium chloride flush 0.9 % injection 5-40 mL, 5-40 mL, IntraVENous, PRN  0.9 % sodium chloride infusion, , IntraVENous, PRN  enoxaparin (LOVENOX) injection 40 mg, 40 mg, SubCUTAneous, Q24H  ondansetron (ZOFRAN-ODT) disintegrating tablet 4 mg, 4 mg, Oral, Q8H PRN **OR** ondansetron (ZOFRAN) injection 4 mg, 4 mg, IntraVENous, Q6H PRN  polyethylene glycol (GLYCOLAX) packet 17 g, 17 g, Oral, Daily PRN  acetaminophen (TYLENOL) tablet 650 mg, 650 mg, Oral, Q6H PRN **OR** acetaminophen (TYLENOL) suppository 650 mg, 650 mg, Rectal, Q6H PRN  piperacillin-tazobactam (ZOSYN) 3,375 mg in dextrose 5 % 50 mL IVPB extended infusion (mini-bag), 3,375 mg, IntraVENous, Q8H  glucagon (rDNA) injection 1 mg, 1 mg, IntraMUSCular, PRN  dextrose 5 % solution, 100 mL/hr, IntraVENous, PRN  lactobacillus (CULTURELLE) capsule 2 capsule, 2 capsule, Oral, TID WC  dextrose bolus (hypoglycemia) 10% 125 mL, 125 mL, IntraVENous, PRN **OR** dextrose bolus (hypoglycemia) 10% 250 mL, 250 mL, IntraVENous, PRN  glucose chewable tablet 4 each, 4 tablet, Oral, PRN  HYDROcodone-acetaminophen (NORCO) 5-325 MG per tablet 1 tablet, 1 tablet, Oral, Q4H PRN **OR** HYDROcodone-acetaminophen (NORCO) 5-325 MG per tablet 2 tablet, 2 tablet, Oral, Q4H PRN  vancomycin (VANCOCIN) intermittent dosing (placeholder), , Other, RX Placeholder  vancomycin (VANCOCIN) 1,500 mg in dextrose 5 % 500 mL IVPB, 1,500 mg, IntraVENous, Q24H    . ASSESSMENT AND PLAN  WBAT LUE  Daily dressing changes starting tomorrow. Pain control. Therapy.

## 2022-04-19 NOTE — CARE COORDINATION
4/19/22, 12:02 PM EDT    DISCHARGE ON GOING [address] day: 2  Location: [facility]15/015-A Reason for admit: Tenosynovitis [M65.9]   Procedure: none  Barriers to Discharge: New ID consult completed, await final cultures. Currently on IV zosyn and Vanc. PCP: Km Austin MD  Readmission Risk Score: 13.9 ( )%  Patient Goals/Plan/Treatment Preferences: From home with alone, plans to stay with granddaughter upon discharge. Request [facility]. Has DME. Spoke with Karen Grove at [facility], they do have availability for [facility] for [facility]. If she would need extensive wound care or infusions they would have to re-evaluate. Will need new orders as HH was not started. No insurance listed, Karen Grove reports they have traditional medicare showing. Did leave message with financial services to update.

## 2022-04-19 NOTE — PROGRESS NOTES
Hospitalist Progress Note    Patient:  Gloria Aguillon      Unit/Bed:7K-15/015-A    YOB: 1929    MRN: [id number]       Acct: [de-identified]     PCP: Louie Montiel MD    Date of Admission: 4/17/2022    Assessment/Plan:    1. Left hand tenosynovitis s/p left hand tenosynovectomy today with Dr. Veronica Gray. Continue IV Zosyn and Vancomycin. Awaiting final culture and sensitivity. Pain control, norco pain panel PRN. Elevate extremity. Daily dressing changes. 2. Possible bacteremia. 1/2 BC +. Consult to ID  3. Elevated inflammatory markers due to #1. 4. Hypokalemia, continue to replace as needed. 5. Ess HTN. Resume home ACE/BB and Norvasc with holding parameters. 6. T2DM. Holding Metformin. Start low dose SSI. Accu checks ac/hs. Hypoglycemic protocol. Carb control meals. 7. Hypothyroidism. Synthroid resumed. 6. Advanced age. PT/OT.          Chief Complaint: Transfer to left had tenosynovitis    Hospital Course: The patient is a 80 y.o. female who presents with left hand tenosynovitis. Please note no records have been sent with this patient and they are not available for review in [address] [place] [address]. Patient reports approximately 2 weeks ago starting with left arm erythema that extended significantly up her arm. She denied fevers. Went to [address] [place] [address] and was transferred to [facility]. She was there for 10 days. She was treated with IV Vanc and Zosyn. Arm had improved. She was changed to an oral ATB for several days with worsening. She was restarted on Zosyn and Vanc. Despite IV ATBs her hand remained quite edematous, erythremic and she lost mobility of her fingers. She has good sensation. General surgery was consulted at outlying facility. CT of the head noted tendon involvement. Transfer was deemed appropriate to [facility], family requested this facility.  Images were reviewed by our hand team, who accepted the transfer with request for hospitalist to admit.        Patient does live home alone. She cares for herself with the assistance from [place] on aging and her family. She uses a walker. Granddaughter reports some falls at home, but nothing recent. Patient is able to perform her own ADLs. She is not able to do deep cleaning, unable to walk up a flight of stair or any significant discharge. She does not exercise. She denies any dizziness, chest pain, SOB/CHARLES or syncope. No history of CAD, CHF, CVA or CKD. She does have a history of diabetes, this is controlled with Metformin alone. Subjective: Feels good. Pain only when moving arm. Still cant move fingers much. Good appetite. No n/v.      Medications:  Reviewed    Infusion Medications    sodium chloride 25 mL/hr at 04/19/22 0440    dextrose       Scheduled Medications    amLODIPine  5 mg Oral Daily    atenolol  50 mg Oral Daily    levothyroxine  88 mcg Oral Daily    lisinopril  20 mg Oral Daily    insulin lispro  0-6 Units SubCUTAneous TID WC    insulin lispro  0-3 Units SubCUTAneous Nightly    sodium chloride flush  5-40 mL IntraVENous 2 times per day    enoxaparin  40 mg SubCUTAneous Q24H    piperacillin-tazobactam  3,375 mg IntraVENous Q8H    lactobacillus  2 capsule Oral TID WC    vancomycin (VANCOCIN) intermittent dosing (placeholder)   Other RX Placeholder    vancomycin  1,500 mg IntraVENous Q24H     PRN Meds: potassium chloride **OR** potassium alternative oral replacement **OR** potassium chloride, sodium chloride flush, sodium chloride, ondansetron **OR** ondansetron, polyethylene glycol, acetaminophen **OR** acetaminophen, glucagon (rDNA), dextrose, dextrose bolus (hypoglycemia) **OR** dextrose bolus (hypoglycemia), glucose, HYDROcodone 5 mg - acetaminophen **OR** HYDROcodone 5 mg - acetaminophen      Intake/Output Summary (Last 24 hours) at 4/19/2022 1146  Last data filed at 4/18/2022 1324  Gross per 24 hour   Intake 240 ml   Output 250 ml   Net -10 ml       Diet:  ADULT DIET;  Regular    Exam:  BP (!) 132/50   Pulse 70   Temp 98.8 °F (37.1 °C) (Oral)   Resp 18   Ht 5' 6\" (1.676 m)   Wt 180 lb (81.6 kg)   LMP  (LMP Unknown)   SpO2 93%   Breastfeeding No   BMI 29.05 kg/m²     General appearance: No apparent distress, appears stated age and cooperative. HEENT: Pupils equal, round, and reactive to light. Conjunctivae/corneas clear. Neck: Supple, with full range of motion. No jugular venous distention. Trachea midline. Respiratory:  Normal respiratory effort. Clear to auscultation, bilaterally without Rales/Wheezes/Rhonchi. Cardiovascular: Regular rate and rhythm with normal S1/S2 without murmurs, rubs or gallops. Abdomen: Soft, non-tender, non-distended with normal bowel sounds. Musculoskeletal: passive and active ROM x 3 extremities. Left UE with surgical dressing. Good neurovascular checks. Skin: Skin color, texture, turgor normal.  Erythema to left hand. Neurologic:  Neurovascularly intact without any focal sensory/motor deficits. Cranial nerves: II-XII intact, grossly non-focal.  Psychiatric: Alert and oriented, thought content appropriate, normal insight  Capillary Refill: Brisk,< 3 seconds   Peripheral Pulses: +2 palpable, equal bilaterally       Labs:   Recent Labs     04/17/22  1640 04/18/22  0541 04/19/22  0630   WBC 15.2* 13.9* 13.7*   HGB 9.4* 9.2* 8.5*   HCT 29.7* 30.1* 28.5*   * 399 389     Recent Labs     04/17/22  1640 04/18/22  0542 04/19/22  0630    138 134*   K 3.3* 3.5 3.2*    104 104   CO2 21* 20* 19*   BUN 9 8 7   CREATININE 0.6 0.6 0.7   CALCIUM 8.3* 7.9* 7.8*     Recent Labs     04/17/22  1640   AST 17   ALT 9*   BILITOT 0.3   ALKPHOS 112     No results for input(s): INR in the last 72 hours. No results for input(s): Mai Payal in the last 72 hours. Urinalysis:    No results found for: Dellie Ilene, BACTERIA, RBCUA, BLOODU, SPECGRAV, Shelly [place] [address]    Radiology:  XR CHEST (2 VW)   Final Result   Borderline heart size. Bilateral small pleural effusions. **This report has been created using voice recognition software. It may contain minor errors which are inherent in voice recognition technology. **      Final report electronically signed by Dr. Kobe Silva on 4/17/2022 5:26 PM          Diet: ADULT DIET;  Regular    DVT prophylaxis: [x] Lovenox                                 [] SCDs                                 [] SQ Heparin                                 [] Encourage ambulation           [] Already on Anticoagulation     Disposition:    [x] Home       [] TCU       [] Rehab       [] Psych       [] SNF       [] [place]       [] Other-    Code Status: Full Code            Electronically signed by ANTHONY Gonzalez CNP on 4/19/2022 at 11:46 AM

## 2022-04-19 NOTE — PROGRESS NOTES
Pharmacy Note  [employer] Result    Shaggy Mcdaniels is a 80 y.o. female, with a positive blood culture result. [employer] message received from [place], laboratory employee on 4/18/2022 at 8:20 PM    First Gram stain result: gram positive cocci in clusters    BioFire BCID result: Staphylococcus (NOT aureus) mecA detected    BioFire BCID and gram stain congruent? Yes    Suspected source? 1 of 2 cultures, possible contaminant    Patient chart has been reviewed for signs/symptoms of infection: Yes  BP (!) 142/70   Pulse 66   Temp 98.2 °F (36.8 °C) (Oral)   Resp 20   Ht 5' 6\" (1.676 m)   Wt 180 lb (81.6 kg)   LMP  (LMP Unknown)   SpO2 94%   Breastfeeding No   BMI 29.05 kg/m²   Lab Results   Component Value Date    WBC 13.9 (H) 04/18/2022     Allergies reviewed  Sulfa antibiotics    Renal function reviewed  Estimated Creatinine Clearance: 64 mL/min (based on SCr of 0.6 mg/dL). Current antibiotic regimen: Zosyn & Vancomycin    Intervention needed: No    Individual contacted: Nurse Nafisa PINEDO    Recommendations: None    Recommendations accepted?  N/A    PRADEEP Honeycutt [facility]  4/18/2022 8:20 PM

## 2022-04-19 NOTE — CONSULTS
Continuous Infusions:   sodium chloride 25 mL/hr at 04/19/22 0440    dextrose       PRN Meds:potassium chloride **OR** potassium alternative oral replacement **OR** potassium chloride, sodium chloride flush, sodium chloride, ondansetron **OR** ondansetron, polyethylene glycol, acetaminophen **OR** acetaminophen, glucagon (rDNA), dextrose, dextrose bolus (hypoglycemia) **OR** dextrose bolus (hypoglycemia), glucose, HYDROcodone 5 mg - acetaminophen **OR** HYDROcodone 5 mg - acetaminophen  Allergies:   ALLERGIES:    Sulfa antibiotics        SOCIAL HISTORY:     TOBACCO:   reports that she has never smoked. She has never used smokeless tobacco.     ETOH:   reports no history of alcohol use. Patient currently lives alone      FAMILY HISTORY:     History reviewed. No pertinent family history. REVIEW OF SYSTEMS:     Constitutional: no fever, no night sweats, no fatigue, no weight loss. Head: no head ache , no head injury, no migranes. Eye: no eye discharge, blurring of vision, no double vision,no eye pain. Ears: + Hearing difficulty, no tinnitus  Mouth/throat: no ulceration, dental caries , dysphagia, no hoarseness and voice change  Respiratory: no cough no chest pain,no shortness of breath,no wheezing  CVS: no palpitation, no chest pain,   GI: no abdominal pain, no nausea , no vomiting, no constipation,no diarrhea. RICKI: no dysuria, frequency and urgency, no hematuria, no kidney stones  Musculoskeletal:  as noted in HPI. Endocrine: no polyuria, polydipsia, no cold or heat intolerance  Hematology: no anemia, no easy brusing or bleeding, no hx of clotting disorder  Dermatology: no skin rash, no skin lesions, no pruritis,  Neurological:no headaches,no dizziness, no seizure, no numbness.   Psychiatry: no depression, no anxiety,no panic attacks, no suicide ideation    PHYSICAL EXAM:     BP (!) 132/50   Pulse 70   Temp 98.8 °F (37.1 °C) (Oral)   Resp 18   Ht 5' 6\" (1.676 m)   Wt 180 lb (81.6 kg)   LMP  (LMP Unknown)   SpO2 93%   Breastfeeding No   BMI 29.05 kg/m²   General apperance:  Awake, alert, not in distress. HEENT: Slightly pale conjunctiva, unicteric sclera, moist oral mucosa. Hard of hearing. Chest: Bilateral air entry  Cardiovascular:  RRR ,S1S2, no murmur or gallop. Abdomen:  Soft, non tender to palpation. Extremities: There is dressing postsurgical wound on her left dorsum of the hand. There is swelling on the hand  Skin:  Warm and dry. CNS: Awake and oriented. LABS:     CBC:   Recent Labs     04/17/22  1640 04/18/22  0541 04/19/22  0630   WBC 15.2* 13.9* 13.7*   HGB 9.4* 9.2* 8.5*   * 399 389     BMP:    Recent Labs     04/17/22  1640 04/18/22  0542 04/19/22  0630    138 134*   K 3.3* 3.5 3.2*    104 104   CO2 21* 20* 19*   BUN 9 8 7   CREATININE 0.6 0.6 0.7   GLUCOSE 152* 148* 138*     Calcium:  Recent Labs     04/19/22  0630   CALCIUM 7.8*     Ionized Calcium:No results for input(s): IONCA in the last 72 hours. Magnesium:  Recent Labs     04/18/22  0542   MG 1.9     Recent Labs     04/18/22 2028 04/19/22  0634 04/19/22  1100   POCGLU 220* 140* 168*     HgbA1C: No results for input(s): LABA1C in the last 72 hours. INR: No results for input(s): INR in the last 72 hours. Hepatic:   Recent Labs     04/17/22  1640   ALKPHOS 112   ALT 9*   AST 17   PROT 6.5   BILITOT 0.3   LABALBU 2.2*     Micro:   Lab Results   Component Value Date    BC No growth-preliminary  04/17/2022       Problem list of patient      Patient Active Problem List   Diagnosis Code    Tenosynovitis M65.9           Impression and Recommendation:   Tenosynovitis of left hand status post incision and drainage: Continue current IV antibiotic. Will follow culture report and adjust antibiotic    Thank you Tommy Curry, * for allowing me to participate in this patient's care.     Laureen Mo MD, MD,FACP 4/19/2022 11:20 AM

## 2022-04-19 NOTE — PROGRESS NOTES
[address] [facility]  Occupational Therapy  Daily Note  Time:   Time In: 7009  Time Out: 1015  Timed Code Treatment Minutes: 25 Minutes  Minutes: 25          Date: 2022  Patient Name: Sonia Daly,   Gender: female      Room: [facility]15/015-A  MRN: [id number]  : 1929  (80 y.o.)  Referring Practitioner: ANTHONY Lucas CNP  Diagnosis: tenosynovitis  Additional Pertinent Hx: The patient is a 80 y.o. female who presents with left hand tenosynovitis. Patient reports approximately 2 weeks ago starting with left arm erythema that extended significantly up her arm. She denied fevers. Went to [address] [place] [address] and was transferred to [facility]. She was there for 10 days. She was treated with IV Vanc and Zosyn. Arm had improved. She was changed to an oral ATB for several days with worsening. She was restarted on Zosyn and Vanc. Despite IV ATBs her hand remained quite edematous, erythremic and she lost mobility of her fingers. She has good sensation. General surgery was consulted at outlying facility. CT of the head noted tendon involvement. Transfer was deemed appropriate to [facility], family requested this facility. Images were reviewed by our hand team, who accepted the transfer with request for hospitalist to admit. Patient does live home alone. She cares for herself with the assistance from [place] on aging and her family. She uses a walker. Granddaughter reports some falls at home, but nothing recent. Patient is able to perform her own ADLs. She is not able to do deep cleaning, unable to walk up a flight of stair or any significant discharge. She does not exercise. She denies any dizziness, chest pain, SOB/CHARLES or syncope. No history of CAD, CHF, CVA or CKD. She does have a history of diabetes, this is controlled with Metformin alone.  sx Left dorsal hand tenosynovectomy on  by Dr. Benjamin Blanco    Restrictions/Precautions:  Restrictions/Precautions: General Precautions,Weight Bearing,Fall Risk  Left Upper Extremity Weight Bearing: Weight Bearing As Tolerated (WBAT after surgery on 4/18)     SUBJECTIVE: Nurse Kim cho'rayshawn session, Up in chair upon arrival, agreeable to OT session    PAIN:  L UE, ice applied, did not rate pain    Vitals: Vitals not assessed per clinical judgement, see nursing flowsheet    COGNITION: Slow Processing    ADL:   Attempted LB dressing of donning/doffing slipper socks without success, educated patient with options for UB dressing due to width of cast, voiced understanding and appreciation . ADDITIONAL ACTIVITIES:  Guided patient through L UE AROM minimal digit flexion/extension, elbow and shoulder flexion/extension and chest press this date x10 reps x1 set in chair in order to increase BUE strength and improve activity tolerance for ADLs and homemaking tasks. Educated on positioning and edema management, voiced understanding    ASSESSMENT:     Activity Tolerance:  Patient tolerance of  treatment: good.        Discharge Recommendations: Continue to assess pending progress  Equipment Recommendations: Equipment Needed:  (continue to assess)  Plan: Times per week: 5x  Times per day: Daily  Current Treatment Recommendations: Strengthening,Patient/Caregiver Education & Training,Balance Training,Functional Mobility Training,Endurance Training,Safety Education & Training,Self-Care / ADL    Patient Education  Patient Education: Home Exercise Program    Goals  Short term goals  Time Frame for Short term goals: by discharge  Short term goal 1: pt will complete functional mobility to/from bathroom with SBA with using AD with or without a platform for LUE to access ADLS  Short term goal 2: pt will complete UB and LB dressing with using one handed adaptive techniques for comfort with no more than min A to increase indep with clothing management  Short term goal 3: pt will complete AROM of RUE and uneffected joints of LUE to increase strength and endurance to complete ADLs  Short term goal 4: pt will complete sit<>stand transfers, including from an ETS, with SBA and requiring 0 vc's for hand placement and correct alignment in prep to perform ADLs    Following session, patient left in safe position with all fall risk precautions in place.

## 2022-04-20 LAB
ANION GAP SERPL CALCULATED.3IONS-SCNC: 12 MEQ/L (ref 8–16)
BASOPHILS # BLD: 0.6 %
BASOPHILS ABSOLUTE: 0.1 THOU/MM3 (ref 0–0.1)
BUN BLDV-MCNC: 7 MG/DL (ref 7–22)
CALCIUM SERPL-MCNC: 8.1 MG/DL (ref 8.5–10.5)
CHLORIDE BLD-SCNC: 104 MEQ/L (ref 98–111)
CO2: 21 MEQ/L (ref 23–33)
CREAT SERPL-MCNC: 0.6 MG/DL (ref 0.4–1.2)
EOSINOPHIL # BLD: 2.8 %
EOSINOPHILS ABSOLUTE: 0.4 THOU/MM3 (ref 0–0.4)
ERYTHROCYTE [DISTWIDTH] IN BLOOD BY AUTOMATED COUNT: 14.6 % (ref 11.5–14.5)
ERYTHROCYTE [DISTWIDTH] IN BLOOD BY AUTOMATED COUNT: 50.2 FL (ref 35–45)
GFR SERPL CREATININE-BSD FRML MDRD: > 90 ML/MIN/1.73M2
GLUCOSE BLD-MCNC: 137 MG/DL (ref 70–108)
GLUCOSE BLD-MCNC: 162 MG/DL (ref 70–108)
GLUCOSE BLD-MCNC: 166 MG/DL (ref 70–108)
GLUCOSE BLD-MCNC: 176 MG/DL (ref 70–108)
GLUCOSE BLD-MCNC: 187 MG/DL (ref 70–108)
HCT VFR BLD CALC: 29.1 % (ref 37–47)
HEMOGLOBIN: 9.5 GM/DL (ref 12–16)
IMMATURE GRANS (ABS): 0.48 THOU/MM3 (ref 0–0.07)
IMMATURE GRANULOCYTES: 3.6 %
LYMPHOCYTES # BLD: 19.7 %
LYMPHOCYTES ABSOLUTE: 2.6 THOU/MM3 (ref 1–4.8)
MCH RBC QN AUTO: 30.9 PG (ref 26–33)
MCHC RBC AUTO-ENTMCNC: 32.6 GM/DL (ref 32.2–35.5)
MCV RBC AUTO: 94.8 FL (ref 81–99)
MONOCYTES # BLD: 17.8 %
MONOCYTES ABSOLUTE: 2.4 THOU/MM3 (ref 0.4–1.3)
NUCLEATED RED BLOOD CELLS: 0 /100 WBC
PLATELET # BLD: 504 THOU/MM3 (ref 130–400)
PLATELET ESTIMATE: ABNORMAL
PMV BLD AUTO: 11.3 FL (ref 9.4–12.4)
POIKILOCYTES: ABNORMAL
POTASSIUM SERPL-SCNC: 3.7 MEQ/L (ref 3.5–5.2)
RBC # BLD: 3.07 MILL/MM3 (ref 4.2–5.4)
SCAN OF BLOOD SMEAR: NORMAL
SEG NEUTROPHILS: 55.5 %
SEGMENTED NEUTROPHILS ABSOLUTE COUNT: 7.4 THOU/MM3 (ref 1.8–7.7)
SODIUM BLD-SCNC: 137 MEQ/L (ref 135–145)
TARGET CELLS: ABNORMAL
WBC # BLD: 13.4 THOU/MM3 (ref 4.8–10.8)

## 2022-04-20 PROCEDURE — 2580000003 HC RX 258: Performed by: NURSE PRACTITIONER

## 2022-04-20 PROCEDURE — 6370000000 HC RX 637 (ALT 250 FOR IP): Performed by: NURSE PRACTITIONER

## 2022-04-20 PROCEDURE — 80048 BASIC METABOLIC PNL TOTAL CA: CPT

## 2022-04-20 PROCEDURE — 99232 SBSQ HOSP IP/OBS MODERATE 35: CPT

## 2022-04-20 PROCEDURE — 6360000002 HC RX W HCPCS: Performed by: NURSE PRACTITIONER

## 2022-04-20 PROCEDURE — 97110 THERAPEUTIC EXERCISES: CPT

## 2022-04-20 PROCEDURE — 36415 COLL VENOUS BLD VENIPUNCTURE: CPT

## 2022-04-20 PROCEDURE — 85025 COMPLETE CBC W/AUTO DIFF WBC: CPT

## 2022-04-20 PROCEDURE — 97530 THERAPEUTIC ACTIVITIES: CPT

## 2022-04-20 PROCEDURE — 82948 REAGENT STRIP/BLOOD GLUCOSE: CPT

## 2022-04-20 PROCEDURE — 97535 SELF CARE MNGMENT TRAINING: CPT

## 2022-04-20 PROCEDURE — 1200000003 HC TELEMETRY R&B

## 2022-04-20 PROCEDURE — 97116 GAIT TRAINING THERAPY: CPT

## 2022-04-20 RX ORDER — INSULIN LISPRO 100 [IU]/ML
0-3 INJECTION, SOLUTION INTRAVENOUS; SUBCUTANEOUS NIGHTLY
Status: DISCONTINUED | OUTPATIENT
Start: 2022-04-20 | End: 2022-04-22 | Stop reason: HOSPADM

## 2022-04-20 RX ORDER — ONDANSETRON 2 MG/ML
4 INJECTION INTRAMUSCULAR; INTRAVENOUS EVERY 6 HOURS PRN
Status: DISCONTINUED | OUTPATIENT
Start: 2022-04-20 | End: 2022-04-22 | Stop reason: HOSPADM

## 2022-04-20 RX ORDER — ONDANSETRON 4 MG/1
4 TABLET, ORALLY DISINTEGRATING ORAL EVERY 8 HOURS PRN
Status: DISCONTINUED | OUTPATIENT
Start: 2022-04-20 | End: 2022-04-22 | Stop reason: HOSPADM

## 2022-04-20 RX ORDER — INSULIN LISPRO 100 [IU]/ML
0-6 INJECTION, SOLUTION INTRAVENOUS; SUBCUTANEOUS
Status: DISCONTINUED | OUTPATIENT
Start: 2022-04-20 | End: 2022-04-22 | Stop reason: HOSPADM

## 2022-04-20 RX ADMIN — Medication 2 CAPSULE: at 09:08

## 2022-04-20 RX ADMIN — INSULIN LISPRO 1 UNITS: 100 INJECTION, SOLUTION INTRAVENOUS; SUBCUTANEOUS at 17:08

## 2022-04-20 RX ADMIN — Medication 2 CAPSULE: at 17:17

## 2022-04-20 RX ADMIN — INSULIN LISPRO 1 UNITS: 100 INJECTION, SOLUTION INTRAVENOUS; SUBCUTANEOUS at 08:16

## 2022-04-20 RX ADMIN — SODIUM CHLORIDE: 9 INJECTION, SOLUTION INTRAVENOUS at 05:46

## 2022-04-20 RX ADMIN — ATENOLOL 50 MG: 50 TABLET ORAL at 09:08

## 2022-04-20 RX ADMIN — PIPERACILLIN AND TAZOBACTAM 3375 MG: 3; .375 INJECTION, POWDER, LYOPHILIZED, FOR SOLUTION INTRAVENOUS at 05:46

## 2022-04-20 RX ADMIN — ACETAMINOPHEN 650 MG: 325 TABLET ORAL at 05:41

## 2022-04-20 RX ADMIN — INSULIN LISPRO 1 UNITS: 100 INJECTION, SOLUTION INTRAVENOUS; SUBCUTANEOUS at 12:20

## 2022-04-20 RX ADMIN — PIPERACILLIN AND TAZOBACTAM 3375 MG: 3; .375 INJECTION, POWDER, LYOPHILIZED, FOR SOLUTION INTRAVENOUS at 20:40

## 2022-04-20 RX ADMIN — ENOXAPARIN SODIUM 40 MG: 100 INJECTION SUBCUTANEOUS at 20:39

## 2022-04-20 RX ADMIN — PIPERACILLIN AND TAZOBACTAM 3375 MG: 3; .375 INJECTION, POWDER, LYOPHILIZED, FOR SOLUTION INTRAVENOUS at 12:24

## 2022-04-20 RX ADMIN — AMLODIPINE BESYLATE 5 MG: 5 TABLET ORAL at 09:08

## 2022-04-20 RX ADMIN — LISINOPRIL 20 MG: 20 TABLET ORAL at 09:08

## 2022-04-20 RX ADMIN — VANCOMYCIN HYDROCHLORIDE 1500 MG: 5 INJECTION, POWDER, LYOPHILIZED, FOR SOLUTION INTRAVENOUS at 18:29

## 2022-04-20 RX ADMIN — LEVOTHYROXINE SODIUM 88 MCG: 0.09 TABLET ORAL at 05:41

## 2022-04-20 RX ADMIN — INSULIN LISPRO 1 UNITS: 100 INJECTION, SOLUTION INTRAVENOUS; SUBCUTANEOUS at 20:39

## 2022-04-20 RX ADMIN — Medication 2 CAPSULE: at 12:19

## 2022-04-20 ASSESSMENT — PAIN SCALES - GENERAL
PAINLEVEL_OUTOF10: 5
PAINLEVEL_OUTOF10: 5

## 2022-04-20 ASSESSMENT — PAIN DESCRIPTION - DESCRIPTORS: DESCRIPTORS: ACHING

## 2022-04-20 ASSESSMENT — PAIN DESCRIPTION - ORIENTATION: ORIENTATION: LEFT

## 2022-04-20 ASSESSMENT — PAIN DESCRIPTION - LOCATION: LOCATION: ARM;HAND

## 2022-04-20 NOTE — PROGRESS NOTES
[address] [facility]  Occupational Therapy  Daily Note  Time:  Time In: 1005  Time Out: 1043  Timed Code Treatment Minutes: 38 Minutes  Minutes: 38          Date: 2022  Patient Name: Bryce Ruff,   Gender: female      Room: [facility]15/015-A  MRN: [id number]  : 1929  (80 y.o.)  Referring Practitioner: ANTHONY Soni CNP  Diagnosis: tenosynovitis  Additional Pertinent Hx: The patient is a 80 y.o. female who presents with left hand tenosynovitis. Patient reports approximately 2 weeks ago starting with left arm erythema that extended significantly up her arm. She denied fevers. Went to [address] [place] [address] and was transferred to [facility]. She was there for 10 days. She was treated with IV Vanc and Zosyn. Arm had improved. She was changed to an oral ATB for several days with worsening. She was restarted on Zosyn and Vanc. Despite IV ATBs her hand remained quite edematous, erythremic and she lost mobility of her fingers. She has good sensation. General surgery was consulted at outlying facility. CT of the head noted tendon involvement. Transfer was deemed appropriate to [facility], family requested this facility. Images were reviewed by our hand team, who accepted the transfer with request for hospitalist to admit. Patient does live home alone. She cares for herself with the assistance from [place] on aging and her family. She uses a walker. Granddaughter reports some falls at home, but nothing recent. Patient is able to perform her own ADLs. She is not able to do deep cleaning, unable to walk up a flight of stair or any significant discharge. She does not exercise. She denies any dizziness, chest pain, SOB/CHARLES or syncope. No history of CAD, CHF, CVA or CKD. She does have a history of diabetes, this is controlled with Metformin alone.  sx Left dorsal hand tenosynovectomy on  by Dr. Jennifer Painter    Restrictions/Precautions:  Restrictions/Precautions: General Precautions,Weight Bearing,Fall Risk  Left Upper Extremity Weight Bearing: Weight Bearing As Tolerated (WBAT after surgery on )     SUBJECTIVE: RN okayed OT treatment. Pt. In room and agreeable to participate. PAIN: Denies    Vitals: Vitals not assessed per clinical judgement, see nursing flowsheet    COGNITION: WFL    ADL:   Grooming: Contact Guard Assistance and with set-up. to wash hand with cloth while standing at sink  [address] with min A to pull down and up undergarment on L side\  Toilet Transfer: Minimal Assistance, Moderate Assistance, with set-up, with verbal cues  and with increased time for completion. to stand with min A/CGA to sit down. BALANCE:  Standing Balance: Contact Guard Assistance, with cues for safety. BED MOBILITY:  Not Tested    TRANSFERS:  Sit to Stand: Moderate Assistance, with increased time for completion, cues for hand placement, with verbal cues. from recliner  Stand to Sit: Contact Guard Assistance, Minimal Assistance, cues for hand placement, with verbal cues. FUNCTIONAL MOBILITY:  Assistive Device: platform walker  Assist Level:  Contact Guard Assistance, with verbal cues  and with increased time for completion. Distance: To and from bathroom  And within room no LOB slow pace        ADDITIONAL ACTIVITIES:  Pt. Instructed on an performed AROM to RUE with all planes and joints x 10 reps each and LUE AROM performed at elbow and shoulder incorporating shoulder and elbow  Flex./ext. and horizontal abd./add. of the shoulder. B shoulder elevation and depression instructed x 10 reps. Pt. Required visual and verbal cues to perform ther ex. Performed in attempt to improve overall strength and activity tolerance to support basic ADls. ASSESSMENT:     Activity Tolerance:  Patient tolerance of  treatment: fair. Discharge Recommendations: Continue to assess pending progress and Patient would benefit from continued OT at discharge Pt.  Reports plan is to discharge to granddaughter's house. Equipment Recommendations: Equipment Needed:  (continue to assess)  Plan: Times per Week: 5x  Times per Day: Daily  Current Treatment Recommendations: Strengthening,Patient/Caregiver Education & Training,Balance Training,Functional Mobility Training,Endurance Training,Safety Education & Training,Self-Care / ADL    Patient Education  Patient Education: ADL's, Home Exercise Program, Importance of Increasing Activity and Assistive Device Safety and safety with functional mobility and transfers. Goals  Short Term Goals  Time Frame for Short term goals: by discharge  Short Term Goal 1: pt will complete functional mobility to/from bathroom with SBA with using AD with or without a platform for LUE to access ADLS  Short Term Goal 2: pt will complete UB and LB dressing with using one handed adaptive techniques for comfort with no more than min A to increase indep with clothing management  Short Term Goal 3: pt will complete AROM of RUE and uneffected joints of LUE to increase strength and endurance to complete ADLs  Short Term Goal 4: pt will complete sit<>stand transfers, including from an ETS, with SBA and requiring 0 vc's for hand placement and correct alignment in prep to perform ADLs    Following session, patient left in safe position with all fall risk precautions in place.

## 2022-04-20 NOTE — PROGRESS NOTES
Hospitalist Progress Note      Patient:  Chalice Heimlich    Unit/Bed:7K-15/015-A  YOB: 1929  MRN: [id number]   Acct: [de-identified]   PCP: Dorothea Escobar MD  Date of Admission: 4/17/2022    Assessment/Plan:    1. Left hand tenosynovitis s/p left hand tenosynovectomy:   · POD 2 tenosynovectomy with Dr. Mya Murillo. Continue IV Zosyn and vancomycin, awaiting final culture and sensitivity. ID was consulted on 04/19. Continue pain control, Norco pain pain panel. Continue to elevate LUE, daily dressing changes. Ortho following. 2. Possible bacteremia:   · 1/2 blood cultures positive. Consult was placed ID on 04/19. Continue current IV antibiotic therapy per ID, pending final report. 3. Hypokalemia, resolved:   · Repeat BMP tomorrow morning. 4. Essential HTN:   · BP relatively stable, continue home ACE/BB and Norvasc. Monitor BP closely and adjust as needed. 5. NIDDMII:   · Holding home metformin. Continue with low-dose SSI, glucose controlled. Accu-Cheks, hypoglycemia protocol in place. Carb controlled diet. 6. Hypothyroidism:   · Continue home medications  7. Advanced age:   · PT/OT    Chief Complaint: Transfer to left hand tenosynovitis     Initial H and P:-    Per chart review: \"The patient is a 80 y. o. female who presents with left hand tenosynovitis. Please note no records have been sent with this patient and they are not available for review in [address] [place] [address]. Patient reports approximately 2 weeks ago starting with left arm erythema that extended significantly up her arm. She denied fevers. Went to [address] [place] [address] and was transferred to [facility]. She was there for 10 days. She was treated with IV Vanc and Zosyn. Arm had improved. She was changed to an oral ATB for several days with worsening. She was restarted on Zosyn and Vanc. Despite IV ATBs her hand remained quite edematous, erythremic and she lost mobility of her fingers.  She has good sensation. General surgery was consulted at outlying facility. CT of the head noted tendon involvement. Transfer was deemed appropriate to [facility], family requested this facility. Images were reviewed by our hand team, who accepted the transfer with request for hospitalist to admit.        Patient does live home alone. She cares for herself with the assistance from [place] on aging and her family. She uses a walker. Granddaughter reports some falls at home, but nothing recent. Patient is able to perform her own ADLs. She is not able to do deep cleaning, unable to walk up a flight of stair or any significant discharge. She does not exercise. She denies any dizziness, chest pain, SOB/CHARLES or syncope. No history of CAD, CHF, CVA or CKD. She does have a history of diabetes, this is controlled with Metformin alone. \"     Subjective (past 24 hours):   Patient resting in bed in no acute distress, no acute vents overnight. Patient notes that she is still experiencing some \"tightness\" in her fingers. Extensive swelling noted to the phalanges. Otherwise, patient denies any other concerns. No chest pain, shortness of breath, abdominal pain, nausea, vomiting. Radial pulses 2+. Past medical history, family history, social history and allergies reviewed again and is unchanged since admission. ROS (All review of systems completed. Pertinent positives noted.  Otherwise All other systems reviewed and negative.)     Medications:  Reviewed    Infusion Medications    sodium chloride 25 mL/hr at 04/20/22 0546    dextrose       Scheduled Medications    amLODIPine  5 mg Oral Daily    atenolol  50 mg Oral Daily    levothyroxine  88 mcg Oral Daily    lisinopril  20 mg Oral Daily    insulin lispro  0-6 Units SubCUTAneous TID WC    insulin lispro  0-3 Units SubCUTAneous Nightly    sodium chloride flush  5-40 mL IntraVENous 2 times per day    enoxaparin  40 mg SubCUTAneous Q24H    piperacillin-tazobactam  3,375 mg IntraVENous Q8H    lactobacillus  2 capsule Oral TID     vancomycin (VANCOCIN) intermittent dosing (placeholder)   Other RX Placeholder    vancomycin  1,500 mg IntraVENous Q24H     PRN Meds: potassium chloride **OR** potassium alternative oral replacement **OR** potassium chloride, sodium chloride flush, sodium chloride, ondansetron **OR** ondansetron, polyethylene glycol, acetaminophen **OR** acetaminophen, glucagon (rDNA), dextrose, dextrose bolus (hypoglycemia) **OR** dextrose bolus (hypoglycemia), glucose, HYDROcodone 5 mg - acetaminophen **OR** HYDROcodone 5 mg - acetaminophen    No intake or output data in the 24 hours ending 04/20/22 0759    Diet:  ADULT DIET; Regular    Exam:  BP (!) 124/53   Pulse 64   Temp 97.9 °F (36.6 °C) (Oral)   Resp 18   Ht 5' 6\" (1.676 m)   Wt 180 lb (81.6 kg)   LMP  (LMP Unknown)   SpO2 95%   Breastfeeding No   BMI 29.05 kg/m²   General appearance: No apparent distress, appears stated age and cooperative. HEENT: Pupils equal, round, and reactive to light. Conjunctivae/corneas clear. Neck: Supple, with full range of motion. No jugular venous distention. Trachea midline. Respiratory:  Normal respiratory effort. Clear to auscultation, bilaterally without Rales/Wheezes/Rhonchi. Cardiovascular: Regular rate and rhythm with normal S1/S2 without murmurs, rubs or gallops. Abdomen: Soft, non-tender, non-distended with normal bowel sounds. Musculoskeletal: passive and active ROM x 4 extremities. Skin: Skin color, texture, turgor normal.  No rashes or lesions. Neurologic:  Neurovascularly intact without any focal sensory/motor deficits.  Cranial nerves: II-XII intact, grossly non-focal.  Psychiatric: Alert and oriented, thought content appropriate, normal insight  Capillary Refill: Brisk,< 3 seconds   Peripheral Pulses: +2 palpable, equal bilaterally     Labs:   Recent Labs     04/18/22  0541 04/19/22  0630 04/20/22  0543   WBC 13.9* 13.7* 13.4*   HGB 9.2* 8.5* 9.5* HCT 30.1* 28.5* 29.1*    389 504*     Recent Labs     04/18/22  0542 04/19/22  0630 04/20/22  0543    134* 137   K 3.5 3.2* 3.7    104 104   CO2 20* 19* 21*   BUN 8 7 7   CREATININE 0.6 0.7 0.6   CALCIUM 7.9* 7.8* 8.1*     Recent Labs     04/17/22  1640   AST 17   ALT 9*   BILITOT 0.3   ALKPHOS 112     No results for input(s): INR in the last 72 hours. No results for input(s): Connee Matar in the last 72 hours. Microbiology:    Blood culture #1:   Lab Results   Component Value Date    BC No growth-preliminary  04/17/2022    [facility]  04/17/2022     possible contamination; clinical correlation required        Blood culture #2:No results found for: [place]    Organism:  Lab Results   Component Value Date    ORG Staphylococcus (coagulase negative) 04/17/2022         Lab Results   Component Value Date    LABGRAM  04/18/2022     Few segmented neutrophils observed. No epithelial cells observed. No bacteria seen. MRSA culture only:No results found for: [facility]    Urine culture: No results found for: LABURIN    Respiratory culture: No results found for: CULTRESP    Aerobic and Anaerobic :  Lab Results   Component Value Date    LABAERO No growth-preliminary No growth  04/18/2022     No results found for: LABANAE    Urinalysis:    No results found for: Alyne Claw, BACTERIA, RBCUA, BLOODU, SPECGRAV, GLUCOSEU    Radiology:  XR CHEST (2 VW)   Final Result   Borderline heart size. Bilateral small pleural effusions. **This report has been created using voice recognition software. It may contain minor errors which are inherent in voice recognition technology. **      Final report electronically signed by Dr. Patricia Handley on 4/17/2022 5:26 PM        XR CHEST (2 VW)    Result Date: 4/17/2022  PROCEDURE: XR CHEST (2 VW) CLINICAL INFORMATION: preop. COMPARISON: No prior study. TECHNIQUE: AP upright and lateral FINDINGS: Patient is rotated. Heart is borderline in size.  Dense mitral annular calcifications are noted. The mediastinum is not widened. Calcified mediastinal lymph nodes and granulomas are noted. There are no infiltrates. There are small bilateral pleural effusions. There is bronchiectasis. The pulmonary vascularity is normal. Bones are severely osteopenic. Borderline heart size. Bilateral small pleural effusions. **This report has been created using voice recognition software. It may contain minor errors which are inherent in voice recognition technology. ** Final report electronically signed by Dr. Fatoumata Barnett on 4/17/2022 5:26 PM      Electronically signed by Darcy King PA-C on 4/20/2022 at 7:59 AM

## 2022-04-20 NOTE — PROGRESS NOTES
Progress note: Infectious diseases    Patient - Bryce Ruff,  Age - 80 y.o.    - 1929      Room Number - 7K-15/015-A   MRN -  [id number]   Acct # - [de-identified]  Date of Admission -  2022  2:43 PM    SUBJECTIVE:   She has less pain, still swollen left hand  OBJECTIVE   VITALS    height is 5' 6\" (1.676 m) and weight is 180 lb (81.6 kg). Her oral temperature is 97.8 °F (36.6 °C). Her blood pressure is 132/58 (abnormal) and her pulse is 74. Her respiration is 18 and oxygen saturation is 96%.        Wt Readings from Last 3 Encounters:   22 180 lb (81.6 kg)       I/O (24 Hours)    Intake/Output Summary (Last 24 hours) at 2022  Last data filed at 2022 1224  Gross per 24 hour   Intake 300 ml   Output --   Net 300 ml       General Appearance  Awake, alert, oriented,  not  In acute distress  HEENT - normocephalic, atraumatic, pink conjunctiva,  anicteric sclera  Neck - Supple, no mass  Lungs -  Bilateral good air entry, no rhonchi, no wheeze  Cardiovascular - Heart sounds are normal.     Abdomen - soft, not distended, nontender,   Neurologic -oriented  Skin - No bruising or bleeding  Extremities - dressed left hand, +swelling    MEDICATIONS:      insulin lispro  0-6 Units SubCUTAneous TID WC    insulin lispro  0-3 Units SubCUTAneous Nightly    amLODIPine  5 mg Oral Daily    atenolol  50 mg Oral Daily    levothyroxine  88 mcg Oral Daily    lisinopril  20 mg Oral Daily    sodium chloride flush  5-40 mL IntraVENous 2 times per day    enoxaparin  40 mg SubCUTAneous Q24H    piperacillin-tazobactam  3,375 mg IntraVENous Q8H    lactobacillus  2 capsule Oral TID WC      sodium chloride 25 mL/hr at 22 0546    dextrose       ondansetron **OR** ondansetron, potassium chloride **OR** potassium alternative oral replacement **OR** potassium chloride, sodium chloride flush, sodium chloride, polyethylene glycol, acetaminophen **OR** acetaminophen, glucagon (rDNA), dextrose, dextrose bolus (hypoglycemia) **OR** dextrose bolus (hypoglycemia), glucose, HYDROcodone 5 mg - acetaminophen **OR** HYDROcodone 5 mg - acetaminophen      LABS:     CBC:   Recent Labs     04/18/22  0541 04/19/22  0630 04/20/22  0543   WBC 13.9* 13.7* 13.4*   HGB 9.2* 8.5* 9.5*    389 504*     BMP:    Recent Labs     04/18/22  0542 04/19/22  0630 04/20/22  0543    134* 137   K 3.5 3.2* 3.7    104 104   CO2 20* 19* 21*   BUN 8 7 7   CREATININE 0.6 0.7 0.6   GLUCOSE 148* 138* 137*     Calcium:  Recent Labs     04/20/22  0543   CALCIUM 8.1*     Ionized Calcium:No results for input(s): IONCA in the last 72 hours. Magnesium:  Recent Labs     04/18/22  0542   MG 1.9     Phosphorus:No results for input(s): PHOS in the last 72 hours. BNP:No results for input(s): BNP in the last 72 hours. Glucose:  Recent Labs     04/20/22  0631 04/20/22  1057 04/20/22  1609   POCGLU 166* 176* 162*     HgbA1C: No results for input(s): LABA1C in the last 72 hours. INR: No results for input(s): INR in the last 72 hours. Hepatic: No results for input(s): ALKPHOS, ALT, AST, PROT, BILITOT, BILIDIR, LABALBU in the last 72 hours. Amylase and Lipase:No results for input(s): LACTA, AMYLASE in the last 72 hours. Lactic Acid: No results for input(s): LACTA in the last 72 hours. Troponin: No results for input(s): CKTOTAL, CKMB, TROPONINI in the last 72 hours. BNP: No results for input(s): BNP in the last 72 hours. CULTURES:   UA: No results for input(s): SPECGRAV, PHUR, COLORU, CLARITYU, MUCUS, PROTEINU, BLOODU, RBCUA, WBCUA, BACTERIA, NITRU, GLUCOSEU, BILIRUBINUR, UROBILINOGEN, KETUA, LABCAST, LABCASTTY, AMORPHOS in the last 72 hours.     Invalid input(s): CRYSTALS  Micro:   Lab Results   Component Value Date    BC No growth-preliminary  04/17/2022    BC  04/17/2022     possible contamination; clinical correlation required           Problem list of patient:     Patient Active Problem List   Diagnosis Code    Tenosynovitis M65.9         ASSESSMENT/PLAN   Left hand tenosynovites: continue iv zosyn, stop vancomycin      Emily Anderson MD, MD, FACP 4/20/2022 7:57 PM

## 2022-04-20 NOTE — CARE COORDINATION
4/20/22, 1:18 PM EDT    DISCHARGE ON [place] day: 3  5N08  Procedure: 4/18  Left dorsal hand tenosynovectomy  Barriers to Discharge:  POD #2, WBAT LUE  daily dressing changes. PT/OT. ID following cultures. IV antibiotics continue. Patient Goals/Plan/Treatment Preferences: plans to go stay with grand daughter Jaison Christian and continue with [facility].

## 2022-04-20 NOTE — PROGRESS NOTES
[address] [address]  INPATIENT PHYSICAL THERAPY  Daily Note  [facility] ORTHOPEDICS 7K - 7K-15/015-A    Time In: 0805  Time Out: 1377  Timed Code Treatment Minutes: 30 Minutes  Minutes: 30          Date: 2022  Patient Name: Tom Garcia,  Gender:  female        MRN: [id number]  : 1929  (80 y.o.)     Referring Practitioner: ANTHONY Brooks CNP  Diagnosis: Tenosynovitis  Additional Pertinent Hx: The patient is a 80 y.o. female who presents with left hand tenosynovitis. Patient reports approximately 2 weeks ago starting with left arm erythema that extended significantly up her arm. She denied fevers. Went to [address] [place] [address] and was transferred to [facility]. She was there for 10 days. She was treated with IV Vanc and Zosyn. Arm had improved. She was changed to an oral ATB for several days with worsening. She was restarted on Zosyn and Vanc. Despite IV ATBs her hand remained quite edematous, erythremic and she lost mobility of her fingers. She has good sensation. General surgery was consulted at outlying facility. CT of the head noted tendon involvement. Transfer was deemed appropriate to [facility], family requested this facility. Images were reviewed by our hand team, who accepted the transfer with request for hospitalist to admit. Patient does live home alone. She cares for herself with the assistance from [place] on aging and her family. She uses a walker. Granddaughter reports some falls at home, but nothing recent. Patient is able to perform her own ADLs. She is not able to do deep cleaning, unable to walk up a flight of stair or any significant discharge. She does not exercise. She denies any dizziness, chest pain, SOB/CHARLES or syncope. No history of CAD, CHF, CVA or CKD. She does have a history of diabetes, this is controlled with Metformin alone. Pt is s/p L dorsal hand tenosynovectomy by Dr Gabe Wagner .      Prior Level of Function:  Lives With: Alone (Uncle who recently had heart attack staying with her and they are \"taking care of each other\")  Type of Home: House  Home Layout: Two level,Able to Live on Main level with bedroom/bathroom  Home Access: Stairs to enter with rails  Entrance Stairs - Number of Steps: 1 platform  Home Equipment: Rolling walker   Bathroom Shower/Tub: Tub/Shower unit  Bathroom Toilet: Standard  Bathroom Equipment: Toilet raiser,Grab bars in shower,Grab bars around toilet,Hand-held shower    ADL Assistance: [address]: Needs assistance (A for sweeping and cleaning. Able to do simple meal prep)  Ambulation Assistance: Independent (with 2WW)  Transfer Assistance: Independent  Additional Comments: Pt is going to go stay with juliana melgar where she will  have ramp to enter and level house However Per pt's son the granddaughter is a nurse who works 5 days a week    Restrictions/Precautions:  Restrictions/Precautions: General Precautions,Weight Bearing,Fall Risk  Left Upper Extremity Weight Bearing: Weight Bearing As Tolerated (WBAT after surgery on 4/18)     SUBJECTIVE: Pt. Briguillermo Dalton in bed and pleasantly agrees to therapy session. Pt. Noted to have increased coughing throughout session. Educated pt. On importance of mobility to prevent pneumonia. PAIN: None indicated    Vitals: Vitals not assessed per clinical judgement, see nursing flowsheet    OBJECTIVE:  Bed Mobility:  Rolling to Right: Minimal Assistance   Supine to Sit: Contact Guard Assistance    Transfers:  Sit to Stand: Minimal Assistance  Stand to Sit:Contact Guard Assistance    Ambulation:  Contact Guard Assistance  Distance: 20' x 1  Surface: Level Tile  Device:Platform walker  Gait Deviations: Forward Flexed Posture, Slow Terrie, Decreased Step Length Bilaterally, Decreased Gait Speed, Decreased Heel Strike Bilaterally, Unsteady Gait and mild difficulty maneuvering walker with retro stepping.     Balance:  None    Exercise:  Patient was guided in 1 set(s) 10 reps of exercises: Glut sets, Seated marches, Seated hamstring curls, Seated heel/toe raises, Long arc quads, Seated isometric hip adduction, Seated abduction/adduction, and abdominal isometrics. Exercises were completed for increased independence with functional mobility. Functional Outcome Measures: Not completed       ASSESSMENT:  Assessment: Patient progressing toward established goals. Activity Tolerance:  Patient tolerance of  treatment: good. Equipment Recommendations:Equipment Needed:  (continue to assess)  Discharge Recommendations: Continue to assess pending progress     Plan: Times per week: 5x O  Times per day: Daily  Current Treatment Recommendations: Strengthening,Balance Training,Transfer Training,Gait Training,Functional Mobility Training    Patient Education  Patient Education: Plan of Care, Equipment Education, Transfers, Gait, Verbal Exercise Instruction    Goals:  Patient goals : none stated  Short Term Goals  Time Frame for Short term goals: by discharge  Short term goal 1: bed mobility with HOB flat, no rails mod I for increased functional ind  Short term goal 2: sit<>stand from various surfaces with LRD mod I safe transfers  Short term goal 3: ambulate Shelly  1560' with LRD Mod I for safe household distances  Long Term Goals  Time Frame for Long term goals : NA d/t short ELOS    Following session, patient left in safe position with all fall risk precautions in place.

## 2022-04-20 NOTE — PROGRESS NOTES
5-40 mL, IntraVENous, 2 times per day  sodium chloride flush 0.9 % injection 5-40 mL, 5-40 mL, IntraVENous, PRN  0.9 % sodium chloride infusion, , IntraVENous, PRN  enoxaparin (LOVENOX) injection 40 mg, 40 mg, SubCUTAneous, Q24H  ondansetron (ZOFRAN-ODT) disintegrating tablet 4 mg, 4 mg, Oral, Q8H PRN **OR** ondansetron (ZOFRAN) injection 4 mg, 4 mg, IntraVENous, Q6H PRN  polyethylene glycol (GLYCOLAX) packet 17 g, 17 g, Oral, Daily PRN  acetaminophen (TYLENOL) tablet 650 mg, 650 mg, Oral, Q6H PRN **OR** acetaminophen (TYLENOL) suppository 650 mg, 650 mg, Rectal, Q6H PRN  piperacillin-tazobactam (ZOSYN) 3,375 mg in dextrose 5 % 50 mL IVPB extended infusion (mini-bag), 3,375 mg, IntraVENous, Q8H  glucagon (rDNA) injection 1 mg, 1 mg, IntraMUSCular, PRN  dextrose 5 % solution, 100 mL/hr, IntraVENous, PRN  lactobacillus (CULTURELLE) capsule 2 capsule, 2 capsule, Oral, TID WC  dextrose bolus (hypoglycemia) 10% 125 mL, 125 mL, IntraVENous, PRN **OR** dextrose bolus (hypoglycemia) 10% 250 mL, 250 mL, IntraVENous, PRN  glucose chewable tablet 4 each, 4 tablet, Oral, PRN  HYDROcodone-acetaminophen (NORCO) 5-325 MG per tablet 1 tablet, 1 tablet, Oral, Q4H PRN **OR** HYDROcodone-acetaminophen (NORCO) 5-325 MG per tablet 2 tablet, 2 tablet, Oral, Q4H PRN  vancomycin (VANCOCIN) intermittent dosing (placeholder), , Other, RX Placeholder  vancomycin (VANCOCIN) 1,500 mg in dextrose 5 % 500 mL IVPB, 1,500 mg, IntraVENous, Q24H    . ASSESSMENT AND PLAN    WBAT LUE  Daily dressing changes starting tomorrow. Pain control. Therapy.

## 2022-04-21 LAB
ANION GAP SERPL CALCULATED.3IONS-SCNC: 11 MEQ/L (ref 8–16)
BUN BLDV-MCNC: 6 MG/DL (ref 7–22)
CALCIUM SERPL-MCNC: 7.8 MG/DL (ref 8.5–10.5)
CHLORIDE BLD-SCNC: 103 MEQ/L (ref 98–111)
CO2: 22 MEQ/L (ref 23–33)
CREAT SERPL-MCNC: 0.6 MG/DL (ref 0.4–1.2)
ERYTHROCYTE [DISTWIDTH] IN BLOOD BY AUTOMATED COUNT: 14.5 % (ref 11.5–14.5)
ERYTHROCYTE [DISTWIDTH] IN BLOOD BY AUTOMATED COUNT: 52.9 FL (ref 35–45)
GFR SERPL CREATININE-BSD FRML MDRD: > 90 ML/MIN/1.73M2
GLUCOSE BLD-MCNC: 137 MG/DL (ref 70–108)
GLUCOSE BLD-MCNC: 143 MG/DL (ref 70–108)
GLUCOSE BLD-MCNC: 174 MG/DL (ref 70–108)
GLUCOSE BLD-MCNC: 188 MG/DL (ref 70–108)
GLUCOSE BLD-MCNC: 207 MG/DL (ref 70–108)
HCT VFR BLD CALC: 28.6 % (ref 37–47)
HEMOGLOBIN: 8.9 GM/DL (ref 12–16)
MCH RBC QN AUTO: 30.9 PG (ref 26–33)
MCHC RBC AUTO-ENTMCNC: 31.1 GM/DL (ref 32.2–35.5)
MCV RBC AUTO: 99.3 FL (ref 81–99)
PLATELET # BLD: 466 THOU/MM3 (ref 130–400)
PMV BLD AUTO: 11.3 FL (ref 9.4–12.4)
POTASSIUM SERPL-SCNC: 3.3 MEQ/L (ref 3.5–5.2)
RBC # BLD: 2.88 MILL/MM3 (ref 4.2–5.4)
SODIUM BLD-SCNC: 136 MEQ/L (ref 135–145)
WBC # BLD: 10.4 THOU/MM3 (ref 4.8–10.8)

## 2022-04-21 PROCEDURE — 99232 SBSQ HOSP IP/OBS MODERATE 35: CPT

## 2022-04-21 PROCEDURE — 97530 THERAPEUTIC ACTIVITIES: CPT

## 2022-04-21 PROCEDURE — 6360000002 HC RX W HCPCS: Performed by: NURSE PRACTITIONER

## 2022-04-21 PROCEDURE — 6370000000 HC RX 637 (ALT 250 FOR IP): Performed by: NURSE PRACTITIONER

## 2022-04-21 PROCEDURE — 94760 N-INVAS EAR/PLS OXIMETRY 1: CPT

## 2022-04-21 PROCEDURE — 82948 REAGENT STRIP/BLOOD GLUCOSE: CPT

## 2022-04-21 PROCEDURE — 85027 COMPLETE CBC AUTOMATED: CPT

## 2022-04-21 PROCEDURE — 36415 COLL VENOUS BLD VENIPUNCTURE: CPT

## 2022-04-21 PROCEDURE — 1200000003 HC TELEMETRY R&B

## 2022-04-21 PROCEDURE — 97110 THERAPEUTIC EXERCISES: CPT

## 2022-04-21 PROCEDURE — 80048 BASIC METABOLIC PNL TOTAL CA: CPT

## 2022-04-21 PROCEDURE — 2580000003 HC RX 258: Performed by: NURSE PRACTITIONER

## 2022-04-21 PROCEDURE — 97535 SELF CARE MNGMENT TRAINING: CPT

## 2022-04-21 PROCEDURE — 97116 GAIT TRAINING THERAPY: CPT

## 2022-04-21 RX ADMIN — INSULIN LISPRO 1 UNITS: 100 INJECTION, SOLUTION INTRAVENOUS; SUBCUTANEOUS at 21:12

## 2022-04-21 RX ADMIN — Medication 2 CAPSULE: at 09:01

## 2022-04-21 RX ADMIN — AMLODIPINE BESYLATE 5 MG: 5 TABLET ORAL at 09:01

## 2022-04-21 RX ADMIN — PIPERACILLIN AND TAZOBACTAM 3375 MG: 3; .375 INJECTION, POWDER, LYOPHILIZED, FOR SOLUTION INTRAVENOUS at 04:20

## 2022-04-21 RX ADMIN — Medication 2 CAPSULE: at 16:34

## 2022-04-21 RX ADMIN — PIPERACILLIN AND TAZOBACTAM 3375 MG: 3; .375 INJECTION, POWDER, LYOPHILIZED, FOR SOLUTION INTRAVENOUS at 14:07

## 2022-04-21 RX ADMIN — LEVOTHYROXINE SODIUM 88 MCG: 0.09 TABLET ORAL at 05:11

## 2022-04-21 RX ADMIN — PIPERACILLIN AND TAZOBACTAM 3375 MG: 3; .375 INJECTION, POWDER, LYOPHILIZED, FOR SOLUTION INTRAVENOUS at 20:34

## 2022-04-21 RX ADMIN — LISINOPRIL 20 MG: 20 TABLET ORAL at 09:01

## 2022-04-21 RX ADMIN — ATENOLOL 50 MG: 50 TABLET ORAL at 09:01

## 2022-04-21 RX ADMIN — ACETAMINOPHEN 650 MG: 325 TABLET ORAL at 05:11

## 2022-04-21 RX ADMIN — ENOXAPARIN SODIUM 40 MG: 100 INJECTION SUBCUTANEOUS at 20:34

## 2022-04-21 RX ADMIN — Medication 2 CAPSULE: at 14:05

## 2022-04-21 RX ADMIN — ACETAMINOPHEN 650 MG: 325 TABLET ORAL at 18:37

## 2022-04-21 RX ADMIN — INSULIN LISPRO 1 UNITS: 100 INJECTION, SOLUTION INTRAVENOUS; SUBCUTANEOUS at 16:34

## 2022-04-21 ASSESSMENT — PAIN DESCRIPTION - DESCRIPTORS: DESCRIPTORS: ACHING

## 2022-04-21 ASSESSMENT — PAIN SCALES - GENERAL
PAINLEVEL_OUTOF10: 0
PAINLEVEL_OUTOF10: 5
PAINLEVEL_OUTOF10: 5

## 2022-04-21 ASSESSMENT — PAIN DESCRIPTION - ORIENTATION: ORIENTATION: LEFT

## 2022-04-21 ASSESSMENT — PAIN DESCRIPTION - LOCATION: LOCATION: ARM;HAND

## 2022-04-21 NOTE — PROGRESS NOTES
Hospitalist Progress Note      Patient:  Leonie Aguirre    Unit/Bed:7K-15/015-A  YOB: 1929  MRN: [id number]   Acct: [de-identified]   PCP: Stefania Singleton MD  Date of Admission: 4/17/2022    Assessment/Plan:    1. Left hand tenosynovitis s/p left hand tenosynovectomy:   · POD 2 tenosynovectomy with Dr. Chuck Church. Continue IV Zosyn, Vancomycin stopped 04/20. ID was consulted on 04/19. Continue pain control, Norco pain pain panel. Continue to elevate LUE, daily dressing changes. Ortho following. 2. Possible bacteremia, ruled out:   · 1/2 blood cultures positive. Consult was placed ID on 04/19. Continue IV Zosyn, Vancomycin stopped 04/20.  3. Hypokalemia:   · Replace per protocol and monitor closely with repeat BMP in AM.   4. Essential HTN:   · BP relatively stable, continue home ACE/BB and Norvasc. Monitor BP closely and adjust as needed. 5. NIDDMII:   · Holding home metformin. Continue with low-dose SSI, glucose controlled. Accu-Cheks, hypoglycemia protocol in place. Carb controlled diet. 6. Hypothyroidism:   · Continue home medications  7. Advanced age:   · PT/OT    Chief Complaint: Transfer to left hand tenosynovitis     Initial H and P:-    Per chart review: \"The patient is a 80 y. o. female who presents with left hand tenosynovitis. Please note no records have been sent with this patient and they are not available for review in [address] [place] [address]. Patient reports approximately 2 weeks ago starting with left arm erythema that extended significantly up her arm. She denied fevers. Went to [address] [place] [address] and was transferred to [facility]. She was there for 10 days. She was treated with IV Vanc and Zosyn. Arm had improved. She was changed to an oral ATB for several days with worsening. She was restarted on Zosyn and Vanc. Despite IV ATBs her hand remained quite edematous, erythremic and she lost mobility of her fingers.  She has good sensation. General surgery was consulted at outlying facility. CT of the head noted tendon involvement. Transfer was deemed appropriate to [facility], family requested this facility. Images were reviewed by our hand team, who accepted the transfer with request for hospitalist to admit.        Patient does live home alone. She cares for herself with the assistance from [place] on aging and her family. She uses a walker. Granddaughter reports some falls at home, but nothing recent. Patient is able to perform her own ADLs. She is not able to do deep cleaning, unable to walk up a flight of stair or any significant discharge. She does not exercise. She denies any dizziness, chest pain, SOB/CHARLES or syncope. No history of CAD, CHF, CVA or CKD. She does have a history of diabetes, this is controlled with Metformin alone. \"     04/20: Patient resting in bed in no acute distress, no acute vents overnight. Patient notes that she is still experiencing some \"tightness\" in her fingers. Extensive swelling noted to the phalanges. Otherwise, patient denies any other concerns. No chest pain, shortness of breath, abdominal pain, nausea, vomiting. Radial pulses 2+. Subjective (past 24 hours):   Pt resting in bed in no acute distress. Pt notes that she has persistent tightness in right phalanges and mild pain. She denies any further concerns, no cp, sob, abd pain, n/v. Continue zosyn. Past medical history, family history, social history and allergies reviewed again and is unchanged since admission. ROS (All review of systems completed. Pertinent positives noted.  Otherwise All other systems reviewed and negative.)     Medications:  Reviewed    Infusion Medications    sodium chloride 25 mL/hr at 04/20/22 0546    dextrose       Scheduled Medications    insulin lispro  0-6 Units SubCUTAneous TID WC    insulin lispro  0-3 Units SubCUTAneous Nightly    amLODIPine  5 mg Oral Daily    atenolol  50 mg Oral Daily    levothyroxine  88 mcg Oral Daily    lisinopril  20 mg Oral Daily    sodium chloride flush  5-40 mL IntraVENous 2 times per day    enoxaparin  40 mg SubCUTAneous Q24H    piperacillin-tazobactam  3,375 mg IntraVENous Q8H    lactobacillus  2 capsule Oral TID WC     PRN Meds: ondansetron **OR** ondansetron, potassium chloride **OR** potassium alternative oral replacement **OR** potassium chloride, sodium chloride flush, sodium chloride, polyethylene glycol, acetaminophen **OR** acetaminophen, glucagon (rDNA), dextrose, dextrose bolus (hypoglycemia) **OR** dextrose bolus (hypoglycemia), glucose, HYDROcodone 5 mg - acetaminophen **OR** HYDROcodone 5 mg - acetaminophen      Intake/Output Summary (Last 24 hours) at 4/21/2022 0839  Last data filed at 4/20/2022 1224  Gross per 24 hour   Intake 300 ml   Output --   Net 300 ml       Diet:  ADULT DIET; Regular    Exam:  BP (!) 145/63   Pulse 67   Temp 98.5 °F (36.9 °C) (Oral)   Resp 18   Ht 5' 6\" (1.676 m)   Wt 180 lb (81.6 kg)   LMP  (LMP Unknown)   SpO2 93%   Breastfeeding No   BMI 29.05 kg/m²   General appearance: No apparent distress, appears stated age and cooperative. HEENT: Pupils equal, round, and reactive to light. Conjunctivae/corneas clear. Neck: Supple, with full range of motion. No jugular venous distention. Trachea midline. Respiratory:  Normal respiratory effort. Clear to auscultation, bilaterally without Rales/Wheezes/Rhonchi. Cardiovascular: Regular rate and rhythm with normal S1/S2 without murmurs, rubs or gallops. Abdomen: Soft, non-tender, non-distended with normal bowel sounds. Musculoskeletal: passive and active ROM x 4 extremities. Skin: Skin color, texture, turgor normal.  No rashes or lesions. Neurologic:  Neurovascularly intact without any focal sensory/motor deficits.  Cranial nerves: II-XII intact, grossly non-focal.  Psychiatric: Alert and oriented, thought content appropriate, normal insight  Capillary Refill: Brisk,< 3 seconds Peripheral Pulses: +2 palpable, equal bilaterally     Labs:   Recent Labs     04/19/22  0630 04/20/22  0543 04/21/22  0546   WBC 13.7* 13.4* 10.4   HGB 8.5* 9.5* 8.9*   HCT 28.5* 29.1* 28.6*    504* 466*     Recent Labs     04/19/22  0630 04/20/22  0543 04/21/22  0546   * 137 136   K 3.2* 3.7 3.3*    104 103   CO2 19* 21* 22*   BUN 7 7 6*   CREATININE 0.7 0.6 0.6   CALCIUM 7.8* 8.1* 7.8*     No results for input(s): AST, ALT, BILIDIR, BILITOT, ALKPHOS in the last 72 hours. No results for input(s): INR in the last 72 hours. No results for input(s): Joyice Archer in the last 72 hours. Microbiology:    Blood culture #1:   Lab Results   Component Value Date    BC No growth-preliminary  04/17/2022    [facility]  04/17/2022     possible contamination; clinical correlation required        Blood culture #2:No results found for: Patrick Srs    Organism:  Lab Results   Component Value Date    ORG Staphylococcus (coagulase negative) 04/17/2022         Lab Results   Component Value Date    LABGRAM  04/18/2022     Few segmented neutrophils observed. No epithelial cells observed. No bacteria seen. MRSA culture only:No results found for: [facility]    Urine culture: No results found for: LABURIN    Respiratory culture: No results found for: CULTRESP    Aerobic and Anaerobic :  Lab Results   Component Value Date    LABAERO No growth-preliminary No growth  04/18/2022     Lab Results   Component Value Date    LABANAE No growth-preliminary  04/18/2022       Urinalysis:    No results found for: Cherri Ficks, BACTERIA, RBCUA, BLOODU, SPECGRAV, GLUCOSEU    Radiology:  XR CHEST (2 VW)   Final Result   Borderline heart size. Bilateral small pleural effusions. **This report has been created using voice recognition software. It may contain minor errors which are inherent in voice recognition technology. **      Final report electronically signed by Dr. Don May on 4/17/2022 5:26 PM        XR CHEST (2 VW)    Result Date: 4/17/2022  PROCEDURE: XR CHEST (2 VW) CLINICAL INFORMATION: preop. COMPARISON: No prior study. TECHNIQUE: AP upright and lateral FINDINGS: Patient is rotated. Heart is borderline in size. Dense mitral annular calcifications are noted. The mediastinum is not widened. Calcified mediastinal lymph nodes and granulomas are noted. There are no infiltrates. There are small bilateral pleural effusions. There is bronchiectasis. The pulmonary vascularity is normal. Bones are severely osteopenic. Borderline heart size. Bilateral small pleural effusions. **This report has been created using voice recognition software. It may contain minor errors which are inherent in voice recognition technology. ** Final report electronically signed by Dr. Venkatesh Maravilla on 4/17/2022 5:26 PM      Electronically signed by Rickey Garcia PA-C on 4/21/2022 at 7:53 AM

## 2022-04-21 NOTE — PROGRESS NOTES
[facility]  INPATIENT PHYSICAL THERAPY  Daily Note  [facility] [[email or web address]] ORTHOPEDICS 7K - 7K-15/015-A    Time In: 1010  Time Out: 1049  Timed Code Treatment Minutes: 39 Minutes  Minutes: 39          Date: 2022  Patient Name: Italia Montaño,  Gender:  female        MRN: [id number]  : 1929  (80 y.o.)     Referring Practitioner: ANTHONY Fabian CNP  Diagnosis: Tenosynovitis  Additional Pertinent Hx: The patient is a 80 y.o. female who presents with left hand tenosynovitis. Patient reports approximately 2 weeks ago starting with left arm erythema that extended significantly up her arm. She denied fevers. Went to [address] [place] [address] and was transferred to [facility]. She was there for 10 days. She was treated with IV Vanc and Zosyn. Arm had improved. She was changed to an oral ATB for several days with worsening. She was restarted on Zosyn and Vanc. Despite IV ATBs her hand remained quite edematous, erythremic and she lost mobility of her fingers. She has good sensation. General surgery was consulted at outlying facility. CT of the head noted tendon involvement. Transfer was deemed appropriate to [facility], family requested this facility. Images were reviewed by our hand team, who accepted the transfer with request for hospitalist to admit. Patient does live home alone. She cares for herself with the assistance from [place] on aging and her family. She uses a walker. Granddaughter reports some falls at home, but nothing recent. Patient is able to perform her own ADLs. She is not able to do deep cleaning, unable to walk up a flight of stair or any significant discharge. She does not exercise. She denies any dizziness, chest pain, SOB/CHARLES or syncope. No history of CAD, CHF, CVA or CKD. She does have a history of diabetes, this is controlled with Metformin alone. Pt is s/p L dorsal hand tenosynovectomy by Dr Sukhwinder Reeder .      Prior Level of Function:  Lives With: Alone (Uncle who recently had heart attack staying with her and they are \"taking care of each other\")  Type of Home: House  Home Layout: Two level,Able to Live on Main level with bedroom/bathroom  Home Access: Stairs to enter with rails  Entrance Stairs - Number of Steps: 1 platform  Home Equipment: Rolling walker   Bathroom Shower/Tub: Tub/Shower unit  Bathroom Toilet: Standard  Bathroom Equipment: Toilet raiser,Grab bars in shower,Grab bars around toilet,Hand-held shower    ADL Assistance: [address]: Needs assistance (A for sweeping and cleaning. Able to do simple meal prep)  Ambulation Assistance: Independent (with 2WW)  Transfer Assistance: Independent  Additional Comments: Pt is going to go stay with juliana melgar where she will  have ramp to enter and level house However Per pt's son the granddaughter is a nurse who works 5 days a week    Restrictions/Precautions:  Restrictions/Precautions: General Precautions,Weight Bearing,Fall Risk  Left Upper Extremity Weight Bearing: Weight Bearing As Tolerated (WBAT after surgery on 4/18)     SUBJECTIVE: Pt. Tiny Tg in bed and pleasantly agrees to therapy session. Pt. Talkative throughout session. Pt. Requests to use restroom during session prior to transferring to [facility] chair. PAIN: None indicated    Vitals: Vitals not assessed per clinical judgement, see nursing flowsheet    OBJECTIVE:  Bed Mobility:  Rolling to Right: Stand By Assistance, with increased time for completion   Supine to Sit: Stand By Assistance, with increased time for completion    Transfers:  Sit to Stand: Contact Guard Assistance  Stand to [employer] Assistance    Ambulation:  Contact Guard Assistance  Distance: 35' x 1, 12' x 1  Surface: Level Tile  Device:platform walker  Gait Deviations:   Forward Flexed Posture, Slow Terrie, Decreased Step Length Bilaterally, Decreased Gait Speed, Decreased Heel Strike Bilaterally and Decreased Terminal Knee Extension    Balance:  Dynamic Sitting Balance: Stand By Assistance  Static Standing Balance: Contact Guard Assistance  Dynamic Standing Balance: Contact Guard Assistance  Pt. Sat on toilet with no trunk support while toileting and completing pericare. Pt. Then stood at [facility] while PTA assisted with clothing management. Exercise:  Patient was guided in 1 set(s) 10 reps of exercises: Glut sets, Seated marches, Seated hamstring curls, Seated heel/toe raises, Long arc quads, Seated isometric hip adduction, Seated abduction/adduction, and abdominal isometrics. Exercises were completed for increased independence with functional mobility. Functional Outcome Measures: Not completed       ASSESSMENT:  Assessment: Patient progressing toward established goals. Activity Tolerance:  Patient tolerance of  treatment: good. Equipment Recommendations:Equipment Needed:  (continue to assess)  Discharge Recommendations: Continue to assess pending progress Home with Home Health PT    Plan: Times per week: 5x O  Times per day: Daily  Current Treatment Recommendations: Strengthening,Balance Training,Transfer Training,Gait Training,Functional Mobility Training    Patient Education  Patient Education: Plan of Care, Transfers, Reviewed Prior Education, Gait, Verbal Exercise Instruction, Health Promotion and Wellness Education    Goals:  Patient goals : none stated  Short Term Goals  Time Frame for Short term goals: by discharge  Short term goal 1: bed mobility with HOB flat, no rails mod I for increased functional ind  Short term goal 2: sit<>stand from various surfaces with LRD mod I safe transfers  Short term goal 3: ambulate 100' with LRD Mod I for safe household distances  Long Term Goals  Time Frame for Long term goals : NA d/t short ELOS    Following session, patient left in safe position with all fall risk precautions in place.

## 2022-04-21 NOTE — PLAN OF CARE
Problem: Skin Integrity:  Goal: Will show no infection signs and symptoms  Description: Will show no infection signs and symptoms  Outcome: Progressing  Note: No s/sx of infection noted during this shift. Patient remains afebrile and VS stable. Patient is currently on Zosyn. Goal: Absence of new skin breakdown  Description: Absence of new skin breakdown  Outcome: Progressing  Note: No new skin issues noted during this shift. See skin assessment. Patient is able to reposition with minimal assistance from staff. Problem: Pain:  Goal: Pain level will decrease  Description: Pain level will decrease  Outcome: Progressing  Note: Patient complains of pain in the left hand with a rating of 4-5 on 0-10 scale. PRN pain medication available when needed. Elevation and ice packs also used. Problem: Falls - Risk of:  Goal: Will remain free from falls  Description: Will remain free from falls  Outcome: Progressing  Note: Patient has remained free of falls during this shift. Appropriate fall prevention measures in place. Patient is compliant with using call light for assistance when needed. Goal: Absence of physical injury  Description: Absence of physical injury  Outcome: Progressing  Note: Patient has remained free of physical injury during this shift. Safe environment provided, call light within reach, and hourly rounding completed. Problem: Discharge Planning:  Goal: Discharged to appropriate level of care  Description: Discharged to appropriate level of care  Outcome: Progressing  Note: Discharge planning in progress. Patient is planning to return home with granddaughter at discharge. Case management assisting with discharge needs. Problem: Neurological  Goal: Maximum potential motor/sensory/cognitive function  Outcome: Progressing  Note: Patient is alert and oriented x 4. Neuro assessment within normal limits.       Problem: Cardiovascular  Goal: No DVT, peripheral vascular complications  Outcome: Progressing  Note: No s/sx of DVT during this shift. Problem: Respiratory  Goal: No pulmonary complications  Outcome: Progressing  Note: Patient remains free of pulmonary complications. Lung sounds clear and diminished. Patient encouraged to use IS. Goal: O2 Sat > 90%  Outcome: Progressing  Note: O2 sats remain greater than 90% on room air. Problem: GI  Goal: No bowel complications  Outcome: Progressing  Note: Bowel sounds active x 4. Patient reports passing gas. No BM during this shift. Problem:   Goal: Adequate urinary output  Outcome: Progressing  Note: Patient has voided adequate amounts of clear, yellow urine during this shift. Problem: Nutrition  Goal: Optimal nutrition therapy  Outcome: Progressing  Note: Patient is on a general diet. Denies any nausea or vomiting during this shift. Tolerates diet well. Care plan reviewed with patient. Patient verbalize understanding of the plan of care and contribute to goal setting.

## 2022-04-21 NOTE — CARE COORDINATION
4/21/22, 12:14 PM EDT    DISCHARGE ON [place] day: 4  Location: [facility]15/015A Reason for admit: Tenosynovitis [M65.9]   Procedure: 4/18: Lft dorsal hand tenosynovectomy  Barriers to Discharge: Daily dressing change. Continue IV Zosyn. Stop IV vanc. PCP: Ida Sotomayor MD  Readmission Risk Score: 14.3 ( )%  Patient Goals/Plan/Treatment Preferences: plans to go stay with grand daughter Franci Campbell and continue with [facility].

## 2022-04-21 NOTE — PROGRESS NOTES
Progress note: Infectious diseases    Patient - Duane Nicole,  Age - 80 y.o.    - 1929      Room Number - 7K-15/015-A   MRN -  [id number]   Acct # - [de-identified]  Date of Admission -  2022  2:43 PM    SUBJECTIVE:   She has no new complaints  Denies of fever. OBJECTIVE   VITALS    height is 5' 6\" (1.676 m) and weight is 180 lb (81.6 kg). Her oral temperature is 97.9 °F (36.6 °C). Her blood pressure is 161/65 (abnormal) and her pulse is 66. Her respiration is 16 and oxygen saturation is 98%. Wt Readings from Last 3 Encounters:   22 180 lb (81.6 kg)       I/O (24 Hours)    Intake/Output Summary (Last 24 hours) at 2022 1711  Last data filed at 2022 1437  Gross per 24 hour   Intake 300 ml   Output --   Net 300 ml       General Appearance  Awake, alert, oriented,  not  In acute distress  HEENT - normocephalic, atraumatic, pink conjunctiva,  anicteric sclera  Neck - Supple, no mass  Lungs -  Bilateral   air entry, no rhonchi, no wheeze  Cardiovascular - Heart sounds are normal.     Abdomen - soft, not distended, nontender,   Neurologic -oriented  Skin - No bruising or bleeding  Extremities - open wound on the left dorsum of the hand. there is swelling , no prulent draiange.     MEDICATIONS:      insulin lispro  0-6 Units SubCUTAneous TID WC    insulin lispro  0-3 Units SubCUTAneous Nightly    amLODIPine  5 mg Oral Daily    atenolol  50 mg Oral Daily    levothyroxine  88 mcg Oral Daily    lisinopril  20 mg Oral Daily    sodium chloride flush  5-40 mL IntraVENous 2 times per day    enoxaparin  40 mg SubCUTAneous Q24H    piperacillin-tazobactam  3,375 mg IntraVENous Q8H    lactobacillus  2 capsule Oral TID WC      sodium chloride 25 mL/hr at 22 0546    dextrose       ondansetron **OR** ondansetron, potassium chloride **OR** potassium alternative oral replacement **OR** potassium chloride, sodium chloride flush, sodium chloride, polyethylene glycol, acetaminophen **OR** acetaminophen, glucagon (rDNA), dextrose, dextrose bolus (hypoglycemia) **OR** dextrose bolus (hypoglycemia), glucose, HYDROcodone 5 mg - acetaminophen **OR** HYDROcodone 5 mg - acetaminophen      LABS:     CBC:   Recent Labs     04/19/22  0630 04/20/22  0543 04/21/22  0546   WBC 13.7* 13.4* 10.4   HGB 8.5* 9.5* 8.9*    504* 466*     BMP:    Recent Labs     04/19/22  0630 04/20/22  0543 04/21/22  0546   * 137 136   K 3.2* 3.7 3.3*    104 103   CO2 19* 21* 22*   BUN 7 7 6*   CREATININE 0.7 0.6 0.6   GLUCOSE 138* 137* 143*     Calcium:  Recent Labs     04/21/22  0546   CALCIUM 7.8*     Ionized Calcium:No results for input(s): IONCA in the last 72 hours. Magnesium:  No results for input(s): MG in the last 72 hours. Phosphorus:No results for input(s): PHOS in the last 72 hours. BNP:No results for input(s): BNP in the last 72 hours. Glucose:  Recent Labs     04/21/22  0658 04/21/22  1050 04/21/22  1544   POCGLU 137* 174* 188*        CULTURES:   UA: No results for input(s): SPECGRAV, PHUR, COLORU, CLARITYU, MUCUS, PROTEINU, BLOODU, RBCUA, WBCUA, BACTERIA, NITRU, GLUCOSEU, BILIRUBINUR, UROBILINOGEN, KETUA, LABCAST, LABCASTTY, AMORPHOS in the last 72 hours.     Invalid input(s): CRYSTALS  Micro:   Lab Results   Component Value Date    BC No growth-preliminary  04/17/2022    BC  04/17/2022     possible contamination; clinical correlation required           Problem list of patient:     Patient Active Problem List   Diagnosis Code    Tenosynovitis M65.9         ASSESSMENT/PLAN   Left hand tenosynovites: continue iv zosyn,     Caprice Lancaster MD, MD, FACP 4/21/2022 5:11 PM

## 2022-04-21 NOTE — PROGRESS NOTES
[facility]  Occupational Therapy  Daily Note  Time:   Time In: 1340  Time Out: 1418  Timed Code Treatment Minutes: 38 Minutes  Minutes: 38          Date: 2022  Patient Name: Aarti Taylor,   Gender: female      Room: [facility]15/015-A  MRN: [id number]  : 1929  (80 y.o.)  Referring Practitioner: ANTHONY Boothe CNP  Diagnosis: tenosynovitis  Additional Pertinent Hx: The patient is a 80 y.o. female who presents with left hand tenosynovitis. Patient reports approximately 2 weeks ago starting with left arm erythema that extended significantly up her arm. She denied fevers. Went to [address] [place] [address] and was transferred to [facility]. She was there for 10 days. She was treated with IV Vanc and Zosyn. Arm had improved. She was changed to an oral ATB for several days with worsening. She was restarted on Zosyn and Vanc. Despite IV ATBs her hand remained quite edematous, erythremic and she lost mobility of her fingers. She has good sensation. General surgery was consulted at outlying facility. CT of the head noted tendon involvement. Transfer was deemed appropriate to [facility], family requested this facility. Images were reviewed by our hand team, who accepted the transfer with request for hospitalist to admit. Patient does live home alone. She cares for herself with the assistance from [place] [facility] and her family. She uses a walker. Granddaughter reports some falls at home, but nothing recent. Patient is able to perform her own ADLs. She is not able to do deep cleaning, unable to walk up a flight of stair or any significant discharge. She does not exercise. She denies any dizziness, chest pain, SOB/CHARLES or syncope. No history of CAD, CHF, CVA or CKD. She does have a history of diabetes, this is controlled with Metformin alone.  sx Left dorsal hand tenosynovectomy on  by Dr. Lexi Leal    Restrictions/Precautions:  Restrictions/Precautions: General Precautions,Weight Bearing,Fall Risk  Left Upper Extremity Weight Bearing: Weight Bearing As Tolerated (WBAT after surgery on 4/18)     SUBJECTIVE: RN okayed OT session. Pt. In room seated in chair pleasant and agreeable to OT session. PAIN: Denies    Vitals: Vitals not assessed per clinical judgement, see nursing flowsheet    COGNITION: WFL    ADL:   Toileting: Stand By Assistance, [employer], Dependent and with increased time for completion. Total care provided for throrough posterior brayan care. Toilet Transfer: [employer] and with verbal cues . Yaritza Aparicio BALANCE:  Sitting Balance:  Stand By Assistance. Standing Balance: Contact Guard Assistance, with cues for safety. BED MOBILITY:  Not Tested    TRANSFERS:  Sit to Stand:  Minimal Assistance, Moderate Assistance, cues for hand placement, with verbal cues, to/from chair with arms. Stand to Sit: [employer], cues for hand placement, with verbal cues. FUNCTIONAL MOBILITY:  Assistive Device: platform walker  Assist Level:  Contact Guard Assistance. Distance: To and from bathroom  Pt. Requires Assistance with turning RW with turning after toileting     ADDITIONAL ACTIVITIES:  Pt. Instructed on an performed AROM to RUE with all planes and joints x 10 reps each and LUE AROM performed at elbow and shoulder incorporating shoulder and elbow  Flex./ext. and horizontal abd./add. of the shoulder. B shoulder elevation and depression instructed x 10 reps. Pt. Required visual and verbal cues to perform ther ex. Performed in attempt to improve overall strength and activity tolerance to support basic ADls. ASSESSMENT:     Activity Tolerance:  Patient tolerance of  treatment: good.        Discharge Recommendations: Continue to assess pending progress, 24 hour assistance or supervision and Patient would benefit from continued OT at discharge  Equipment Recommendations: Equipment Needed:  (continue to assess)  Plan: Times per Week: 5x  Times per Day: Daily  Current Treatment Recommendations: Strengthening,Patient/Caregiver Education & Training,Balance Training,Functional Mobility Training,Endurance Training,Safety Education & Training,Self-Care / ADL    Patient Education  Patient Education: ADL's, Home Exercise Program and Assistive Device Safety and safety with functional mobility and transfers. Goals  Short Term Goals  Time Frame for Short term goals: by discharge  Short Term Goal 1: pt will complete functional mobility to/from bathroom with SBA with using AD with or without a platform for LUE to access ADLS  Short Term Goal 2: pt will complete UB and LB dressing with using one handed adaptive techniques for comfort with no more than min A to increase indep with clothing management  Short Term Goal 3: pt will complete AROM of RUE and uneffected joints of LUE to increase strength and endurance to complete ADLs  Short Term Goal 4: pt will complete sit<>stand transfers, including from an ETS, with SBA and requiring 0 vc's for hand placement and correct alignment in prep to perform ADLs    Following session, patient left in safe position with all fall risk precautions in place.

## 2022-04-22 VITALS
HEIGHT: 66 IN | SYSTOLIC BLOOD PRESSURE: 126 MMHG | TEMPERATURE: 98.6 F | RESPIRATION RATE: 18 BRPM | OXYGEN SATURATION: 95 % | WEIGHT: 180 LBS | HEART RATE: 74 BPM | DIASTOLIC BLOOD PRESSURE: 61 MMHG | BODY MASS INDEX: 28.93 KG/M2

## 2022-04-22 LAB
ANION GAP SERPL CALCULATED.3IONS-SCNC: 13 MEQ/L (ref 8–16)
BUN BLDV-MCNC: 6 MG/DL (ref 7–22)
CALCIUM SERPL-MCNC: 8 MG/DL (ref 8.5–10.5)
CHLORIDE BLD-SCNC: 105 MEQ/L (ref 98–111)
CO2: 22 MEQ/L (ref 23–33)
CREAT SERPL-MCNC: 0.6 MG/DL (ref 0.4–1.2)
ERYTHROCYTE [DISTWIDTH] IN BLOOD BY AUTOMATED COUNT: 14.8 % (ref 11.5–14.5)
ERYTHROCYTE [DISTWIDTH] IN BLOOD BY AUTOMATED COUNT: 53.2 FL (ref 35–45)
GFR SERPL CREATININE-BSD FRML MDRD: > 90 ML/MIN/1.73M2
GLUCOSE BLD-MCNC: 134 MG/DL (ref 70–108)
GLUCOSE BLD-MCNC: 139 MG/DL (ref 70–108)
GLUCOSE BLD-MCNC: 195 MG/DL (ref 70–108)
HCT VFR BLD CALC: 26.7 % (ref 37–47)
HEMOGLOBIN: 8.4 GM/DL (ref 12–16)
MCH RBC QN AUTO: 30.8 PG (ref 26–33)
MCHC RBC AUTO-ENTMCNC: 31.5 GM/DL (ref 32.2–35.5)
MCV RBC AUTO: 97.8 FL (ref 81–99)
PLATELET # BLD: 474 THOU/MM3 (ref 130–400)
PMV BLD AUTO: 11.5 FL (ref 9.4–12.4)
POTASSIUM SERPL-SCNC: 3.5 MEQ/L (ref 3.5–5.2)
RBC # BLD: 2.73 MILL/MM3 (ref 4.2–5.4)
SODIUM BLD-SCNC: 140 MEQ/L (ref 135–145)
WBC # BLD: 9.4 THOU/MM3 (ref 4.8–10.8)

## 2022-04-22 PROCEDURE — 6370000000 HC RX 637 (ALT 250 FOR IP): Performed by: NURSE PRACTITIONER

## 2022-04-22 PROCEDURE — 36415 COLL VENOUS BLD VENIPUNCTURE: CPT

## 2022-04-22 PROCEDURE — 97535 SELF CARE MNGMENT TRAINING: CPT

## 2022-04-22 PROCEDURE — 97110 THERAPEUTIC EXERCISES: CPT

## 2022-04-22 PROCEDURE — 85027 COMPLETE CBC AUTOMATED: CPT

## 2022-04-22 PROCEDURE — 82948 REAGENT STRIP/BLOOD GLUCOSE: CPT

## 2022-04-22 PROCEDURE — 97116 GAIT TRAINING THERAPY: CPT

## 2022-04-22 PROCEDURE — 2580000003 HC RX 258: Performed by: NURSE PRACTITIONER

## 2022-04-22 PROCEDURE — 99239 HOSP IP/OBS DSCHRG MGMT >30: CPT

## 2022-04-22 PROCEDURE — 80048 BASIC METABOLIC PNL TOTAL CA: CPT

## 2022-04-22 PROCEDURE — 6360000002 HC RX W HCPCS: Performed by: NURSE PRACTITIONER

## 2022-04-22 RX ORDER — AMOXICILLIN AND CLAVULANATE POTASSIUM 875; 125 MG/1; MG/1
1 TABLET, FILM COATED ORAL 2 TIMES DAILY
Qty: 14 TABLET | Refills: 0 | Status: SHIPPED | OUTPATIENT
Start: 2022-04-22 | End: 2022-04-29

## 2022-04-22 RX ADMIN — ATENOLOL 50 MG: 50 TABLET ORAL at 08:21

## 2022-04-22 RX ADMIN — INSULIN LISPRO 1 UNITS: 100 INJECTION, SOLUTION INTRAVENOUS; SUBCUTANEOUS at 13:07

## 2022-04-22 RX ADMIN — ACETAMINOPHEN 650 MG: 325 TABLET ORAL at 06:40

## 2022-04-22 RX ADMIN — PIPERACILLIN AND TAZOBACTAM 3375 MG: 3; .375 INJECTION, POWDER, LYOPHILIZED, FOR SOLUTION INTRAVENOUS at 04:18

## 2022-04-22 RX ADMIN — LEVOTHYROXINE SODIUM 88 MCG: 0.09 TABLET ORAL at 06:16

## 2022-04-22 RX ADMIN — Medication 2 CAPSULE: at 13:07

## 2022-04-22 RX ADMIN — AMLODIPINE BESYLATE 5 MG: 5 TABLET ORAL at 08:21

## 2022-04-22 RX ADMIN — LISINOPRIL 20 MG: 20 TABLET ORAL at 08:21

## 2022-04-22 RX ADMIN — Medication 2 CAPSULE: at 08:23

## 2022-04-22 ASSESSMENT — PAIN SCALES - GENERAL: PAINLEVEL_OUTOF10: 0

## 2022-04-22 NOTE — PROGRESS NOTES
Orthopaedic Progress Note      SUBJECTIVE   Ms. Judy Torres is post op day # 4 s/p Left dorsal hand tenosynovectomy  Pt is seen in bed, states pain is controlled   Left hand drsg is dry and intact - drainage expected with loosely closed incision   Pt can slightly move fingers, significant swelling noted to left fingers  She denies N/T   ID following for abx   Ok from ortho standpoint to discharge         Allergies:     Allergies as of 04/17/2022 - Fully Reviewed 04/17/2022   Allergen Reaction Noted    Sulfa antibiotics  04/17/2022     Current Inpatient Medications:  Current Facility-Administered Medications: ondansetron (ZOFRAN-ODT) disintegrating tablet 4 mg, 4 mg, Oral, Q8H PRN **OR** ondansetron (ZOFRAN) injection 4 mg, 4 mg, IntraVENous, Q6H PRN  insulin lispro (HUMALOG) injection vial 0-6 Units, 0-6 Units, SubCUTAneous, TID WC  insulin lispro (HUMALOG) injection vial 0-3 Units, 0-3 Units, SubCUTAneous, Nightly  potassium chloride (KLOR-CON M) extended release tablet 40 mEq, 40 mEq, Oral, PRN **OR** potassium bicarb-citric acid (EFFER-K) effervescent tablet 40 mEq, 40 mEq, Oral, PRN **OR** potassium chloride 10 mEq/100 mL IVPB (Peripheral Line), 10 mEq, IntraVENous, PRN  amLODIPine (NORVASC) tablet 5 mg, 5 mg, Oral, Daily  atenolol (TENORMIN) tablet 50 mg, 50 mg, Oral, Daily  levothyroxine (SYNTHROID) tablet 88 mcg, 88 mcg, Oral, Daily  lisinopril (PRINIVIL;ZESTRIL) tablet 20 mg, 20 mg, Oral, Daily  sodium chloride flush 0.9 % injection 5-40 mL, 5-40 mL, IntraVENous, 2 times per day  sodium chloride flush 0.9 % injection 5-40 mL, 5-40 mL, IntraVENous, PRN  0.9 % sodium chloride infusion, , IntraVENous, PRN  enoxaparin (LOVENOX) injection 40 mg, 40 mg, SubCUTAneous, Q24H  polyethylene glycol (GLYCOLAX) packet 17 g, 17 g, Oral, Daily PRN  acetaminophen (TYLENOL) tablet 650 mg, 650 mg, Oral, Q6H PRN **OR** acetaminophen (TYLENOL) suppository 650 mg, 650 mg, Rectal, Q6H PRN  piperacillin-tazobactam (ZOSYN) 3,375 mg in dextrose 5 % 50 mL IVPB extended infusion (mini-bag), 3,375 mg, IntraVENous, Q8H  glucagon (rDNA) injection 1 mg, 1 mg, IntraMUSCular, PRN  dextrose 5 % solution, 100 mL/hr, IntraVENous, PRN  lactobacillus (CULTURELLE) capsule 2 capsule, 2 capsule, Oral, TID WC  dextrose bolus (hypoglycemia) 10% 125 mL, 125 mL, IntraVENous, PRN **OR** dextrose bolus (hypoglycemia) 10% 250 mL, 250 mL, IntraVENous, PRN  glucose chewable tablet 4 each, 4 tablet, Oral, PRN  HYDROcodone-acetaminophen (NORCO) 5-325 MG per tablet 1 tablet, 1 tablet, Oral, Q4H PRN **OR** HYDROcodone-acetaminophen (NORCO) 5-325 MG per tablet 2 tablet, 2 tablet, Oral, Q4H PRN    REVIEW OF SYSTEMS:  Constitutional: Denies any fever, chills. Derm: Denies any rash or skin color change. Musculoskeletal: Denies numbness and tingling L hand, Pain to left hand . Neuro: Denies any dizziness, paresthesia or weakness. OBJECTIVE    Patient Vitals for the past 24 hrs:   BP Temp Temp src Pulse Resp SpO2   04/22/22 0817 126/61 98.6 °F (37 °C) Oral 74 18 95 %   04/22/22 0416 (!) 134/53 98.4 °F (36.9 °C) Oral 66 18 96 %   04/21/22 1950 (!) 135/51 98.6 °F (37 °C) Oral 64 16 95 %   04/21/22 1635 (!) 161/65 97.9 °F (36.6 °C) Oral 66 16 98 %     INTAKE/OUTPUT:    Intake/Output Summary (Last 24 hours) at 4/22/2022 1045  Last data filed at 4/22/2022 0427  Gross per 24 hour   Intake 320 ml   Output --   Net 320 ml     I/O last 3 completed shifts: In: 500 [P.O.:500]  Out: -     PHYSICAL EXAM:  General appearance:  Alert and oriented x 3. No apparent distress, appears stated age and cooperative. Musculoskeletal: LUE:  Mild pain to palpation left hand. decreased range of motion left fingers. Pt canslightly flex and extend left fingers . Left hand drsg dry and intact. Wound loosely approximated. Skin: Skin color normal.  No rashes or lesions. Neurologic:  Neurovascularly intact without any focal sensory/motor deficits. Sensation intact.        Data  CBC:   Lab Results Component Value Date    WBC 9.4 04/22/2022    HGB 8.4 04/22/2022     04/22/2022     BMP:    Lab Results   Component Value Date     04/22/2022    K 3.5 04/22/2022    K 3.5 04/18/2022     04/22/2022    CO2 22 04/22/2022    BUN 6 04/22/2022    CREATININE 0.6 04/22/2022    CALCIUM 8.0 04/22/2022    GLUCOSE 139 04/22/2022     Uric Acid:  No components found for: URIC  PT/INR:  No results found for: PROTIME, INR  Troponin:  No results found for: TROPONINI  Urine Culture:  No components found for: CURINE    ASSESSMENT:  Active Hospital Problems    Diagnosis Date Noted    Tenosynovitis [M65.9] 04/17/2022       PLAN  1. Dry drsg changes as needed   2. WBAT  3. PT/OT to eval and treat as needed  4. Continue current medical management   5. [facility] for discharge per ortho standpoint   6.  F/U in office in 2-3 weeks       Electronically signed by ANTHONY Lucas CNP on 4/22/2022 at 9:02 AM

## 2022-04-22 NOTE — PROGRESS NOTES
Patient discharged to home with granddaughter. Discharge instructions reviewed with patient and her granddaughter. Patient assisted to main lobby with [employer].

## 2022-04-22 NOTE — PROGRESS NOTES
Precautions,Weight Bearing,Fall Risk  Left Upper Extremity Weight Bearing: Weight Bearing As Tolerated (WBAT after surgery on 4/18)     SUBJECTIVE: RN okayed OT treatment. Pt. In room seated in recliner pleasant and agreeable to participate in OT services. PAIN: 4-5/10: L wrist    Vitals: Vitals not assessed per clinical judgement, see nursing flowsheet    COGNITION: WFL    ADL:   Grooming: Contact Guard Assistance and with set-up. to stand at sink x 6 mins to complete oral care comb hair and to apply face cream  Lower Extremity Dressing: Maximum Assistance. to thread undergarment onto B feet min A to pull up completely over L hip  Toileting: Contact Guard Assistance, Max A for thorough posterior brayan care     Toilet Transfer: [employer]. Holly Money BALANCE:  Standing Balance: Contact Guard Assistance. BED MOBILITY:  Not Tested    TRANSFERS:  Sit to Stand: Minimal Assistance with increased time to complete. Stand to Sit: Contact Guard Assistance. FUNCTIONAL MOBILITY:  Assistive Device: platform walker  Assist Level:  Contact Guard Assistance. Distance: To and from bathroom       ADDITIONAL ACTIVITIES:  Pt. Instructed on adaptive techniques with clothing with UB dressing due to size of LUE. Pt. Voices good recall and understanding to don shirt. ASSESSMENT:     Activity Tolerance:  Patient tolerance of  treatment: good.        Discharge Recommendations: 24 hour assistance or supervision, Not safe to return home at this time, Home with assist as needed, Home with [address] [place] [address] and Patient would benefit from continued OT at discharge  Equipment Recommendations: Equipment Needed:  (continue to assess)  Plan: Times per Week: 5x  Times per Day: Daily  Current Treatment Recommendations: Strengthening,Patient/Caregiver Education & Training,Balance Training,Functional Mobility Training,Endurance Training,Safety Education & Training,Self-Care / ADL    Patient Education  Patient Education: ADL's, Reviewed Prior Education, Home Safety, Importance of Increasing Activity and Assistive Device Safety and safety with functional mobility and transfers. Goals  Short Term Goals  Time Frame for Short term goals: by discharge  Short Term Goal 1: pt will complete functional mobility to/from bathroom with SBA with using AD with or without a platform for LUE to access ADLS  Short Term Goal 2: pt will complete UB and LB dressing with using one handed adaptive techniques for comfort with no more than min A to increase indep with clothing management  Short Term Goal 3: pt will complete AROM of RUE and uneffected joints of LUE to increase strength and endurance to complete ADLs  Short Term Goal 4: pt will complete sit<>stand transfers, including from an ETS, with SBA and requiring 0 vc's for hand placement and correct alignment in prep to perform ADLs    Following session, patient left in safe position with all fall risk precautions in place.

## 2022-04-22 NOTE — DISCHARGE SUMMARY
Hospitalist Discharge Summary        Patient: Genny Greenwood  YOB: 1929  MRN: [id number]   Acct: [de-identified]    Primary Care Physician: Shell Grimaldo MD    Admit date  4/17/2022    Discharge date: 04/22/22      Chief Complaint on presentation :-  Left hand tenosynovitis    Discharge Assessment and Plan:-   1. Left hand tenosynovitis s/p left hand tenosynovectomy:   ? POD 4 tenosynovectomy with Dr. Yuriy Irvin. Pt received IV Vancomycin and Zosyn through duration of her stay per ID. ID was consulted on 04/19. Pt to be discharged home with 7 days of Augmentin with instruction to f/u with ortho in 2-3 weeks and at the wound clinic in 2 weeks for follow up. Continue to elevate LUE on discharge. 2. Possible bacteremia, ruled out:   ? 1/2 blood cultures positive. Consult was placed ID on 04/19. Pt received IV Zosyn and Vancomycin throughout her stay per ID. Will be transitioned to Augmentin x7 days on discharge. Pt remained afebrile and nontoxic appearing through the duration of her stay. 3. Hypokalemia, resolved:   ? WNL on day of discharge   4. Essential HTN:   ? Resume home meds   5. NIDDMII:   ? Resume home meds. 6. Hypothyroidism:   ? Resume home medications  7. Advanced age:   ? PT/OT    Initial H and P and Hospital course:-  Per chart review: \"The patient is a 80 y. o. female who presents with left hand tenosynovitis. Please note no records have been sent with this patient and they are not available for review in [facility]. Patient reports approximately 2 weeks ago starting with left arm erythema that extended significantly up her arm. She denied fevers. Went to [address] [place] [address] and was transferred to [facility]. She was there for 10 days. She was treated with IV Vanc and Zosyn. Arm had improved. She was changed to an oral ATB for several days with worsening. She was restarted on Zosyn and Vanc.  Despite IV ATBs her hand remained quite edematous, erythremic and she lost mobility of voiced understanding. All questions were answered. Physical Exam:-  1. General appearance: No apparent distress, appears stated age and cooperative. 2. HEENT: Normal cephalic, atraumatic without obvious deformity. Pupils equal, round, and reactive to light. Extra ocular muscles intact. Conjunctivae/corneas clear. 3. Neck: Supple, with full range of motion. No jugular venous distention. Trachea midline. 4. Respiratory:  Normal respiratory effort. Clear to auscultation, bilaterally without Rales/Wheezes/Rhonchi. 5. Cardiovascular: Regular rate and rhythm with normal S1/S2 without murmurs, rubs or gallops. 6. Abdomen: Soft, non-tender, non-distended with normal bowel sounds. 7. Musculoskeletal:  Left hand drsg is dry and intact - drainage expected with loosely closed incision. Pt can slightly move fingers, significant swelling noted to left phalanges. 8. Skin: Skin color, texture, turgor normal.  No rashes or lesions. 9. Neurologic:  Neurovascularly intact without any focal sensory/motor deficits. Cranial nerves: II-XII intact, grossly non-focal.  10. Psychiatric: Alert and oriented, thought content appropriate, normal insight  11. Capillary Refill: Brisk,< 3 seconds   12.  Peripheral Pulses: +2 palpable, equal bilaterally       Vitals:   Patient Vitals for the past 24 hrs:   BP Temp Temp src Pulse Resp SpO2   04/22/22 0817 126/61 98.6 °F (37 °C) Oral 74 18 95 %   04/22/22 0416 (!) 134/53 98.4 °F (36.9 °C) Oral 66 18 96 %   04/21/22 1950 (!) 135/51 98.6 °F (37 °C) Oral 64 16 95 %   04/21/22 1635 (!) 161/65 97.9 °F (36.6 °C) Oral 66 16 98 %     Weight:   Weight: 180 lb (81.6 kg)   24 hour intake/output:     Intake/Output Summary (Last 24 hours) at 4/22/2022 1219  Last data filed at 4/22/2022 0665  Gross per 24 hour   Intake 320 ml   Output --   Net 320 ml         Discharge Medications:-      Medication List      START taking these medications    amoxicillin-clavulanate 875-125 MG per tablet  Commonly known as:  AUGMENTIN  Take 1 tablet by mouth 2 times daily for 7 days        CONTINUE taking these medications    acetaminophen 325 MG tablet  Commonly known as: TYLENOL     Aleve 220 MG Caps  Generic drug: Naproxen Sodium     amLODIPine 5 MG tablet  Commonly known as: NORVASC     atenolol 50 MG tablet  Commonly known as: TENORMIN     DRY EYE RELIEF DROPS OP     hydroCHLOROthiazide 25 MG tablet  Commonly known as: HYDRODIURIL     levothyroxine 88 MCG tablet  Commonly known as: SYNTHROID     lisinopril 20 MG tablet  Commonly known as: PRINIVIL;ZESTRIL     loperamide 2 MG capsule  Commonly known as: IMODIUM     metFORMIN 500 MG tablet  Commonly known as: GLUCOPHAGE     MULTIVITAMIN GUMMIES ADULT PO           Where to Get Your Medications      These medications were sent to [address] [place] [address] , [address]  [address] [place], [place] [address] [address]    Phone: [phone]   · amoxicillin-clavulanate 875-125 MG per tablet          Labs :-  Recent Results (from the past 72 hour(s))   POCT glucose    Collection Time: 04/19/22  3:29 PM   Result Value Ref Range    POC Glucose 169 (H) 70 - 108 mg/dl   POCT glucose    Collection Time: 04/19/22  7:58 PM   Result Value Ref Range    POC Glucose 234 (H) 70 - 108 mg/dl   CBC with Auto Differential    Collection Time: 04/20/22  5:43 AM   Result Value Ref Range    WBC 13.4 (H) 4.8 - 10.8 thou/mm3    RBC 3.07 (L) 4.20 - 5.40 mill/mm3    Hemoglobin 9.5 (L) 12.0 - 16.0 gm/dl    Hematocrit 29.1 (L) 37.0 - 47.0 %    MCV 94.8 81.0 - 99.0 fL    MCH 30.9 26.0 - 33.0 pg    MCHC 32.6 32.2 - 35.5 gm/dl    RDW-CV 14.6 (H) 11.5 - 14.5 %    RDW-SD 50.2 (H) 35.0 - 45.0 fL    Platelets 827 (H) 689 - 400 thou/mm3    MPV 11.3 9.4 - 12.4 fL    Seg Neutrophils 55.5 %    Lymphocytes 19.7 %    Monocytes 17.8 %    Eosinophils 2.8 %    Basophils 0.6 %    Immature Granulocytes 3.6 %    Platelet Estimate SL INCREASED Adequate    Segs Absolute 7. 4 1.8 - 7.7 thou/mm3    Lymphocytes Absolute 2.6 1.0 - 4.8 thou/mm3    Monocytes Absolute 2.4 (H) 0.4 - 1.3 thou/mm3    Eosinophils Absolute 0.4 0.0 - 0.4 thou/mm3    Basophils Absolute 0.1 0.0 - 0.1 thou/mm3    Immature Grans (Abs) 0.48 (H) 0.00 - 0.07 thou/mm3    nRBC 0 /100 wbc    Poikilocytes 1+ Absent    Target Cells 1+ Absent   Basic Metabolic Panel    Collection Time: 04/20/22  5:43 AM   Result Value Ref Range    Sodium 137 135 - 145 meq/L    Potassium 3.7 3.5 - 5.2 meq/L    Chloride 104 98 - 111 meq/L    CO2 21 (L) 23 - 33 meq/L    Glucose 137 (H) 70 - 108 mg/dL    BUN 7 7 - 22 mg/dL    CREATININE 0.6 0.4 - 1.2 mg/dL    Calcium 8.1 (L) 8.5 - 10.5 mg/dL   Anion Gap    Collection Time: 04/20/22  5:43 AM   Result Value Ref Range    Anion Gap 12.0 8.0 - 16.0 meq/L   Glomerular Filtration Rate, Estimated    Collection Time: 04/20/22  5:43 AM   Result Value Ref Range    Est, Glom Filt Rate >90 ml/min/1.73m2   Scan of Blood Smear    Collection Time: 04/20/22  5:43 AM   Result Value Ref Range    SCAN OF BLOOD SMEAR see below    POCT glucose    Collection Time: 04/20/22  6:31 AM   Result Value Ref Range    POC Glucose 166 (H) 70 - 108 mg/dl   POCT glucose    Collection Time: 04/20/22 10:57 AM   Result Value Ref Range    POC Glucose 176 (H) 70 - 108 mg/dl   POCT glucose    Collection Time: 04/20/22  4:09 PM   Result Value Ref Range    POC Glucose 162 (H) 70 - 108 mg/dl   POCT glucose    Collection Time: 04/20/22  8:16 PM   Result Value Ref Range    POC Glucose 187 (H) 70 - 108 mg/dl   Basic Metabolic Panel    Collection Time: 04/21/22  5:46 AM   Result Value Ref Range    Sodium 136 135 - 145 meq/L    Potassium 3.3 (L) 3.5 - 5.2 meq/L    Chloride 103 98 - 111 meq/L    CO2 22 (L) 23 - 33 meq/L    Glucose 143 (H) 70 - 108 mg/dL    BUN 6 (L) 7 - 22 mg/dL    CREATININE 0.6 0.4 - 1.2 mg/dL    Calcium 7.8 (L) 8.5 - 10.5 mg/dL   CBC    Collection Time: 04/21/22  5:46 AM   Result Value Ref Range    WBC 10.4 4.8 - 10.8 thou/mm3    RBC 2.88 (L) 4.20 - 5.40 mill/mm3    Hemoglobin 8.9 (L) 12.0 - 16.0 gm/dl    Hematocrit 28.6 (L) 37.0 - 47.0 %    MCV 99.3 (H) 81.0 - 99.0 fL    MCH 30.9 26.0 - 33.0 pg    MCHC 31.1 (L) 32.2 - 35.5 gm/dl    RDW-CV 14.5 11.5 - 14.5 %    RDW-SD 52.9 (H) 35.0 - 45.0 fL    Platelets 187 (H) 228 - 400 thou/mm3    MPV 11.3 9.4 - 12.4 fL   Anion Gap    Collection Time: 04/21/22  5:46 AM   Result Value Ref Range    Anion Gap 11.0 8.0 - 16.0 meq/L   Glomerular Filtration Rate, Estimated    Collection Time: 04/21/22  5:46 AM   Result Value Ref Range    Est, Glom Filt Rate >90 ml/min/1.73m2   POCT glucose    Collection Time: 04/21/22  6:58 AM   Result Value Ref Range    POC Glucose 137 (H) 70 - 108 mg/dl   POCT glucose    Collection Time: 04/21/22 10:50 AM   Result Value Ref Range    POC Glucose 174 (H) 70 - 108 mg/dl   POCT glucose    Collection Time: 04/21/22  3:44 PM   Result Value Ref Range    POC Glucose 188 (H) 70 - 108 mg/dl   POCT glucose    Collection Time: 04/21/22  8:44 PM   Result Value Ref Range    POC Glucose 207 (H) 70 - 108 mg/dl   Basic Metabolic Panel    Collection Time: 04/22/22  6:14 AM   Result Value Ref Range    Sodium 140 135 - 145 meq/L    Potassium 3.5 3.5 - 5.2 meq/L    Chloride 105 98 - 111 meq/L    CO2 22 (L) 23 - 33 meq/L    Glucose 139 (H) 70 - 108 mg/dL    BUN 6 (L) 7 - 22 mg/dL    CREATININE 0.6 0.4 - 1.2 mg/dL    Calcium 8.0 (L) 8.5 - 10.5 mg/dL   CBC    Collection Time: 04/22/22  6:14 AM   Result Value Ref Range    WBC 9.4 4.8 - 10.8 thou/mm3    RBC 2.73 (L) 4.20 - 5.40 mill/mm3    Hemoglobin 8.4 (L) 12.0 - 16.0 gm/dl    Hematocrit 26.7 (L) 37.0 - 47.0 %    MCV 97.8 81.0 - 99.0 fL    MCH 30.8 26.0 - 33.0 pg    MCHC 31.5 (L) 32.2 - 35.5 gm/dl    RDW-CV 14.8 (H) 11.5 - 14.5 %    RDW-SD 53.2 (H) 35.0 - 45.0 fL    Platelets 844 (H) 486 - 400 thou/mm3    MPV 11.5 9.4 - 12.4 fL   Anion Gap    Collection Time: 04/22/22  6:14 AM   Result Value Ref Range    Anion Gap 13.0 8.0 - 16.0 meq/L Glomerular Filtration Rate, Estimated    Collection Time: 04/22/22  6:14 AM   Result Value Ref Range    Est, Glom Filt Rate >90 ml/min/1.73m2   POCT glucose    Collection Time: 04/22/22  6:18 AM   Result Value Ref Range    POC Glucose 134 (H) 70 - 108 mg/dl   POCT glucose    Collection Time: 04/22/22 10:38 AM   Result Value Ref Range    POC Glucose 195 (H) 70 - 108 mg/dl        Microbiology:    Blood culture #1:   Lab Results   Component Value Date    BC No growth-preliminary  04/17/2022    BC  04/17/2022     possible contamination; clinical correlation required        Blood culture #2:No results found for: BLOODCULT2    Organism:    Lab Results   Component Value Date    LABGRAM  04/18/2022     Few segmented neutrophils observed. No epithelial cells observed. No bacteria seen. MRSA culture only:No results found for: [facility]    Urine culture: No results found for: LABURIN  Lab Results   Component Value Date    ORG Staphylococcus (coagulase negative) 04/17/2022        Respiratory culture: No results found for: CULTRESP    Aerobic and Anaerobic :  Lab Results   Component Value Date    LABAERO No growth-preliminary No growth  04/18/2022     Lab Results   Component Value Date    LABANAE No growth-preliminary  04/18/2022       Urinalysis:    No results found for: Lenord Laws, BACTERIA, RBCUA, BLOODU, SPECGRAV, GLUCOSEU    Radiology:-  XR CHEST (2 VW)    Result Date: 4/17/2022  PROCEDURE: XR CHEST (2 VW) CLINICAL INFORMATION: preop. COMPARISON: No prior study. TECHNIQUE: AP upright and lateral FINDINGS: Patient is rotated. Heart is borderline in size. Dense mitral annular calcifications are noted. The mediastinum is not widened. Calcified mediastinal lymph nodes and granulomas are noted. There are no infiltrates. There are small bilateral pleural effusions. There is bronchiectasis. The pulmonary vascularity is normal. Bones are severely osteopenic. Borderline heart size. Bilateral small pleural effusions.

## 2022-04-22 NOTE — PROGRESS NOTES
[address] [address]  INPATIENT PHYSICAL THERAPY  DAILY NOTE  [facility] ORTHOPEDICS 7K - 7K-15/015-A  Time In:   Time Out: 0932  Timed Code Treatment Minutes: 27 Minutes  Minutes: 27          Date: 2022  Patient Name: Gloria Aguillon,  Gender:  female        MRN: [id number]  : 1929  (80 y.o.)     Referring Practitioner: ANTHONY Giles CNP  Diagnosis: Tenosynovitis  Additional Pertinent Hx: The patient is a 80 y.o. female who presents with left hand tenosynovitis. Patient reports approximately 2 weeks ago starting with left arm erythema that extended significantly up her arm. She denied fevers. Went to [address] [place] [address] and was transferred to [facility]. She was there for 10 days. She was treated with IV Vanc and Zosyn. Arm had improved. She was changed to an oral ATB for several days with worsening. She was restarted on Zosyn and Vanc. Despite IV ATBs her hand remained quite edematous, erythremic and she lost mobility of her fingers. She has good sensation. General surgery was consulted at outlying facility. CT of the head noted tendon involvement. Transfer was deemed appropriate to [facility], family requested this facility. Images were reviewed by our hand team, who accepted the transfer with request for hospitalist to admit. Patient does live home alone. She cares for herself with the assistance from [place] [employer] and her family. She uses a walker. Granddaughter reports some falls at home, but nothing recent. Patient is able to perform her own ADLs. She is not able to do deep cleaning, unable to walk up a flight of stair or any significant discharge. She does not exercise. She denies any dizziness, chest pain, SOB/CHARLES or syncope. No history of CAD, CHF, CVA or CKD. She does have a history of diabetes, this is controlled with Metformin alone. Pt is s/p L dorsal hand tenosynovectomy by Dr Veronica Gray .      Prior Level of Function:  Lives With: Alone (Uncle who recently had heart attack staying with her and they are \"taking care of each other\")  Type of Home: House  Home Layout: Two level,Able to Live on Main level with bedroom/bathroom  Home Access: Stairs to enter with rails  Entrance Stairs - Number of Steps: 1 platform  Home Equipment: Rolling walker   Bathroom Shower/Tub: Tub/Shower unit  Bathroom Toilet: Standard  Bathroom Equipment: Toilet raiser,Grab bars in shower,Grab bars around toilet,Hand-held shower    ADL Assistance: [address]: Needs assistance (A for sweeping and cleaning. Able to do simple meal prep)  Ambulation Assistance: Independent (with 2WW)  Transfer Assistance: Independent  Additional Comments: Pt is going to go stay with juliana afterwards where she will  have ramp to enter and level house However Per pt's son the granddaughter is a nurse who works 5 days a week    Restrictions/Precautions:  Restrictions/Precautions: General Precautions,Weight Bearing,Fall Risk  Left Upper Extremity Weight Bearing: Weight Bearing As Tolerated (WBAT after surgery on 4/18)     SUBJECTIVE: OK to see pt per nursing. Pt in bed when PT arrived, pleasant and agreeable to PT session. Pt L hand swollen, encouraged finger and hand movements to help with swelling, improved positioning following session for L UE to help with swelling.      PAIN: \"little\" pain reported, did not quantify    Vitals: Vitals not assessed per clinical judgement, see nursing flowsheet    OBJECTIVE:  Bed Mobility:  Rolling to Left: Stand By Assistance, X 1, with head of bed raised, with rail   Supine to Sit: Stand By Assistance, X 1, with head of bed raised, with rail, with increased time for completion  Cues for hand placement with good demo  Transfers:  Sit to Stand: Minimal Assistance, X 1, with increased time for completion, cues for hand placement, with verbal cues  Stand to Sit:Contact Guard Assistance, Minimal Assistance, X 1, with increased time for completion, cues for hand placement, with verbal cues  Platform walker for support, cues for safety  Ambulation:  Contact Guard Assistance, X 1, with cues for safety, with verbal cues , with increased time for completion  Distance: 90 feet  Surface: Level Tile  Device:platform walker  Gait Deviations: Forward Flexed Posture, Slow Terrie, Decreased Step Length Bilaterally, Decreased Gait Speed, Unsteady Gait and Decreased Terminal Knee Extension  Cues for maintaining walker close to self at all times, with PT adjusting walker height x2 for pt comfort and mobility. Balance:  Static Sitting Balance:  Supervision  Dynamic Sitting Balance: Supervision, assisted with donning socks prior to amb  Static Standing Balance: Contact Guard Assistance, X 1  Platform walker for support  Exercise:  Patient was guided in 1 set(s) 10-15 reps of exercise to both lower extremities. Seated marches, Seated hamstring curls, Seated heel/toe raises, Long arc quads, Seated isometric hip adduction and Seated abduction/adduction. Exercises were completed for increased independence with functional mobility. VC and visual cues for proper technique of exercises for completion with good demo. Functional Outcome Measures: Completed  AM-PAC Inpatient Mobility Raw Score : 17  AM-PAC Inpatient T-Scale Score : 42.13    ASSESSMENT:  Assessment: Patient progressing toward established goals. improved gait distances with mobility  Activity Tolerance:  Patient tolerance of  treatment: good.      Equipment Recommendations:Equipment Needed:  (continue to assess)  Discharge Recommendations: Continue to assess pending progress, 24 hour assistance or supervision, Home with Home Health PT and Patient would benefit from continued PT at discharge  Plan: Times per week: 5x O  Times per day: Daily  Current Treatment Recommendations: Strengthening,Balance Training,Transfer Training,Gait Training,Functional Mobility Training  Plan:  (5X O)    Patient Education  Patient Education: Plan of Care, Bed Mobility, Equipment Education, Transfers, Gait, Use of Gait [place], Verbal Exercise Instruction,  - Patient Verbalized Understanding, - Patient Requires Continued Education    Goals:  Patient goals : none stated  Short Term Goals  Time Frame for Short term goals: by discharge  Short term goal 1: bed mobility with HOB flat, no rails mod I for increased functional ind  Short term goal 2: sit<>stand from various surfaces with LRD mod I safe transfers  Short term goal 3: ambulate Shelly  1560' with LRD Mod I for safe household distances  Long Term Goals  Time Frame for Long term goals : NA d/t short ELOS    Following session, patient left in safe position with all fall risk precautions in place. In chair, alarm on, call light in reach.

## 2022-04-22 NOTE — PROGRESS NOTES
Progress note: Infectious diseases    Patient - Tom Garcia,  Age - 80 y.o.    - 1929      Room Number - 7K-15/015-A   MRN -  [id number]   Acct # - [de-identified]  Date of Admission -  2022  2:43 PM    SUBJECTIVE:    she wants to go home. she has no new complaints  OBJECTIVE   VITALS    height is 5' 6\" (1.676 m) and weight is 180 lb (81.6 kg). Her oral temperature is 98.6 °F (37 °C). Her blood pressure is 126/61 and her pulse is 74. Her respiration is 18 and oxygen saturation is 95%. Wt Readings from Last 3 Encounters:   22 180 lb (81.6 kg)       I/O (24 Hours)    Intake/Output Summary (Last 24 hours) at 2022 1010  Last data filed at 2022 0427  Gross per 24 hour   Intake 320 ml   Output --   Net 320 ml       General Appearance  Awake, alert, oriented,  not  In acute distress  HEENT - normocephalic, atraumatic, slighlty pale  conjunctiva,  anicteric sclera  Neck - Supple, no mass  Lungs -  Bilateral   air entry, no rhonchi, no wheeze  Cardiovascular - Heart sounds are normal.     Abdomen - soft, not distended, nontender,   Neurologic -oriented  Skin - No bruising or bleeding  Extremities - open wound on the left dorsum of the hand. there is swelling , the redness is less          MEDICATIONS:      insulin lispro  0-6 Units SubCUTAneous TID WC    insulin lispro  0-3 Units SubCUTAneous Nightly    amLODIPine  5 mg Oral Daily    atenolol  50 mg Oral Daily    levothyroxine  88 mcg Oral Daily    lisinopril  20 mg Oral Daily    sodium chloride flush  5-40 mL IntraVENous 2 times per day    enoxaparin  40 mg SubCUTAneous Q24H    piperacillin-tazobactam  3,375 mg IntraVENous Q8H    lactobacillus  2 capsule Oral TID WC      sodium chloride 25 mL/hr at 22 0546    dextrose       ondansetron **OR** ondansetron, potassium chloride **OR** potassium alternative oral replacement **OR** potassium chloride, sodium chloride flush, sodium chloride, polyethylene glycol, acetaminophen **OR** acetaminophen, glucagon (rDNA), dextrose, dextrose bolus (hypoglycemia) **OR** dextrose bolus (hypoglycemia), glucose, HYDROcodone 5 mg - acetaminophen **OR** HYDROcodone 5 mg - acetaminophen      LABS:     CBC:   Recent Labs     04/20/22  0543 04/21/22  0546 04/22/22  0614   WBC 13.4* 10.4 9.4   HGB 9.5* 8.9* 8.4*   * 466* 474*     BMP:    Recent Labs     04/20/22  0543 04/21/22  0546 04/22/22  0614    136 140   K 3.7 3.3* 3.5    103 105   CO2 21* 22* 22*   BUN 7 6* 6*   CREATININE 0.6 0.6 0.6   GLUCOSE 137* 143* 139*     Calcium:  Recent Labs     04/22/22  0614   CALCIUM 8.0*     Ionized Calcium:No results for input(s): IONCA in the last 72 hours. Magnesium:  No results for input(s): MG in the last 72 hours. Phosphorus:No results for input(s): PHOS in the last 72 hours. BNP:No results for input(s): BNP in the last 72 hours. Glucose:  Recent Labs     04/21/22  1544 04/21/22  2044 04/22/22  0618   POCGLU 188* 207* 134*        CULTURES:   UA: No results for input(s): SPECGRAV, PHUR, COLORU, CLARITYU, MUCUS, PROTEINU, BLOODU, RBCUA, WBCUA, BACTERIA, NITRU, GLUCOSEU, BILIRUBINUR, UROBILINOGEN, KETUA, LABCAST, LABCASTTY, AMORPHOS in the last 72 hours.     Invalid input(s): CRYSTALS  Micro:   Lab Results   Component Value Date    BC No growth-preliminary  04/17/2022    BC  04/17/2022     possible contamination; clinical correlation required           Problem list of patient:     Patient Active Problem List   Diagnosis Code    Tenosynovitis M65.9         ASSESSMENT/PLAN   Left hand tenosynovites: she will be discharged with oral augmentin  Daily dressing   Follow up in two wks at the wound clinic  Laureen Mo MD, MD, Annie Houston 4/22/2022 10:10 AM

## 2022-04-23 LAB
AEROBIC CULTURE: NORMAL
ANAEROBIC CULTURE: NORMAL
BLOOD CULTURE, ROUTINE: NORMAL
GRAM STAIN RESULT: NORMAL

## 2022-06-01 ENCOUNTER — HOSPITAL ENCOUNTER (OUTPATIENT)
Dept: WOUND CARE | Age: 87
Discharge: HOME | End: 2022-06-01
Payer: MEDICARE

## 2022-06-01 VITALS
HEART RATE: 66 BPM | OXYGEN SATURATION: 97 % | RESPIRATION RATE: 16 BRPM | SYSTOLIC BLOOD PRESSURE: 151 MMHG | TEMPERATURE: 97.3 F | DIASTOLIC BLOOD PRESSURE: 67 MMHG

## 2022-06-01 PROCEDURE — 99213 OFFICE O/P EST LOW 20 MIN: CPT

## 2022-06-01 NOTE — PLAN OF CARE
Problem: Wound:  Goal: Will show signs of wound healing; wound closure and no evidence of infection  Description: Will show signs of wound healing; wound closure and no evidence of infection  Outcome: Progressing   Patient seen in clinic today for oral abx and left hand wound. No s/s of infection. See AVS. Follow up as needed. Care plan reviewed with patient and family. Patient and family verbalize understanding of the plan of care and contribute to goal setting.

## 2022-07-27 ENCOUNTER — TELEPHONE (OUTPATIENT)
Dept: WOUND CARE | Age: 87
End: 2022-07-27

## (undated) DEVICE — GLOVE ORANGE PI 8 1/2   MSG9085

## (undated) DEVICE — SUTURE VCRL + SZ 2-0 L27IN ABSRB UD CP-1 1/2 CIR REV CUT VCP266H

## (undated) DEVICE — GLOVE SURG SZ 9 THK91MIL LTX FREE SYN POLYISOPRENE ANTI

## (undated) DEVICE — GLOVE ORANGE PI 7 1/2   MSG9075

## (undated) DEVICE — BASIC SINGLE BASIN BTC-LF: Brand: MEDLINE INDUSTRIES, INC.

## (undated) DEVICE — DRAPE,EXTREMITY,89X128,STERILE: Brand: MEDLINE

## (undated) DEVICE — SUTURE VIC + LEN CP1 0 70CM VCP267H

## (undated) DEVICE — SPONGE LAP W18XL18IN WHT COT 4 PLY FLD STRUNG RADPQ DISP ST

## (undated) DEVICE — PACK PROCEDURE SURG SET UP SRMC

## (undated) DEVICE — BANDAGE,GAUZE,4.5"X4.1YD,STERILE,LF: Brand: MEDLINE

## (undated) DEVICE — BANDAGE COMPR E SGL LAYERED CLSR BGE W/ CLP W4INXL15FT

## (undated) DEVICE — PENCIL SMK EVAC ALL IN 1 DSGN ENH VISIBILITY IMPROVED AIR

## (undated) DEVICE — ROYAL SILK SURGICAL GOWN, XXL: Brand: CONVERTORS

## (undated) DEVICE — SPONGE GZ W4XL4IN COT 12 PLY TYP VII WVN C FLD DSGN

## (undated) DEVICE — PACK-MAJOR

## (undated) DEVICE — GLOVE ORANGE PI 8   MSG9080

## (undated) DEVICE — 450 ML BOTTLE OF 0.05% CHLORHEXIDINE GLUCONATE IN 99.95% STERILE WATER FOR IRRIGATION, USP AND APPLICATOR.: Brand: IRRISEPT ANTIMICROBIAL WOUND LAVAGE